# Patient Record
Sex: MALE | Race: BLACK OR AFRICAN AMERICAN | Employment: FULL TIME | URBAN - METROPOLITAN AREA
[De-identification: names, ages, dates, MRNs, and addresses within clinical notes are randomized per-mention and may not be internally consistent; named-entity substitution may affect disease eponyms.]

---

## 2018-03-20 ENCOUNTER — TELEPHONE (OUTPATIENT)
Dept: FAMILY MEDICINE CLINIC | Age: 48
End: 2018-03-20

## 2018-04-24 ENCOUNTER — HOSPITAL ENCOUNTER (EMERGENCY)
Age: 48
Discharge: HOME | End: 2018-04-24
Attending: EMERGENCY MEDICINE
Payer: COMMERCIAL

## 2018-04-24 ENCOUNTER — APPOINTMENT (OUTPATIENT)
Dept: GENERAL RADIOLOGY | Age: 48
End: 2018-04-24
Payer: COMMERCIAL

## 2018-04-24 VITALS
OXYGEN SATURATION: 99 % | BODY MASS INDEX: 42.66 KG/M2 | WEIGHT: 315 LBS | TEMPERATURE: 98.2 F | RESPIRATION RATE: 18 BRPM | DIASTOLIC BLOOD PRESSURE: 117 MMHG | SYSTOLIC BLOOD PRESSURE: 187 MMHG | HEIGHT: 72 IN | HEART RATE: 73 BPM

## 2018-04-24 DIAGNOSIS — M75.50 SUBACROMIAL BURSITIS: Primary | ICD-10-CM

## 2018-04-24 DIAGNOSIS — I10 ESSENTIAL HYPERTENSION: ICD-10-CM

## 2018-04-24 PROCEDURE — 99283 EMERGENCY DEPT VISIT LOW MDM: CPT

## 2018-04-24 PROCEDURE — 73030 X-RAY EXAM OF SHOULDER: CPT

## 2018-04-24 PROCEDURE — 6370000000 HC RX 637 (ALT 250 FOR IP): Performed by: EMERGENCY MEDICINE

## 2018-04-24 RX ORDER — CLONIDINE HYDROCHLORIDE 0.1 MG/1
0.1 TABLET ORAL ONCE
Status: COMPLETED | OUTPATIENT
Start: 2018-04-24 | End: 2018-04-24

## 2018-04-24 RX ORDER — DOXAZOSIN MESYLATE 1 MG/1
1 TABLET ORAL NIGHTLY
Qty: 30 TABLET | Refills: 3 | Status: ON HOLD | OUTPATIENT
Start: 2018-04-24 | End: 2020-10-05 | Stop reason: HOSPADM

## 2018-04-24 RX ORDER — HYDRALAZINE HYDROCHLORIDE 50 MG/1
50 TABLET, FILM COATED ORAL EVERY 8 HOURS SCHEDULED
Status: DISCONTINUED | OUTPATIENT
Start: 2018-04-24 | End: 2018-04-24 | Stop reason: HOSPADM

## 2018-04-24 RX ORDER — DICLOFENAC SODIUM 75 MG/1
75 TABLET, DELAYED RELEASE ORAL 2 TIMES DAILY
Qty: 20 TABLET | Refills: 0 | Status: SHIPPED | OUTPATIENT
Start: 2018-04-24

## 2018-04-24 RX ADMIN — CLONIDINE HYDROCHLORIDE 0.1 MG: 0.1 TABLET ORAL at 12:19

## 2018-04-24 RX ADMIN — HYDRALAZINE HYDROCHLORIDE 50 MG: 50 TABLET, FILM COATED ORAL at 12:52

## 2018-04-24 ASSESSMENT — PAIN DESCRIPTION - DESCRIPTORS: DESCRIPTORS: STABBING

## 2018-04-24 ASSESSMENT — PAIN DESCRIPTION - PAIN TYPE: TYPE: ACUTE PAIN

## 2018-04-24 ASSESSMENT — PAIN DESCRIPTION - FREQUENCY: FREQUENCY: CONTINUOUS

## 2018-04-24 ASSESSMENT — PAIN DESCRIPTION - LOCATION: LOCATION: SHOULDER

## 2018-04-24 ASSESSMENT — PAIN DESCRIPTION - ORIENTATION: ORIENTATION: LEFT

## 2020-10-01 ENCOUNTER — APPOINTMENT (OUTPATIENT)
Dept: ULTRASOUND IMAGING | Age: 50
DRG: 439 | End: 2020-10-01
Payer: COMMERCIAL

## 2020-10-01 ENCOUNTER — HOSPITAL ENCOUNTER (INPATIENT)
Age: 50
LOS: 3 days | Discharge: HOME | DRG: 439 | End: 2020-10-05
Attending: EMERGENCY MEDICINE | Admitting: FAMILY MEDICINE
Payer: COMMERCIAL

## 2020-10-01 PROCEDURE — 83605 ASSAY OF LACTIC ACID: CPT

## 2020-10-01 PROCEDURE — 85025 COMPLETE CBC W/AUTO DIFF WBC: CPT

## 2020-10-01 PROCEDURE — 99284 EMERGENCY DEPT VISIT MOD MDM: CPT

## 2020-10-01 PROCEDURE — 96374 THER/PROPH/DIAG INJ IV PUSH: CPT

## 2020-10-01 PROCEDURE — 96375 TX/PRO/DX INJ NEW DRUG ADDON: CPT

## 2020-10-01 PROCEDURE — 99283 EMERGENCY DEPT VISIT LOW MDM: CPT

## 2020-10-01 PROCEDURE — 83690 ASSAY OF LIPASE: CPT

## 2020-10-01 PROCEDURE — 76705 ECHO EXAM OF ABDOMEN: CPT

## 2020-10-01 RX ORDER — ONDANSETRON 2 MG/ML
4 INJECTION INTRAMUSCULAR; INTRAVENOUS EVERY 6 HOURS PRN
Status: DISCONTINUED | OUTPATIENT
Start: 2020-10-01 | End: 2020-10-02

## 2020-10-01 RX ORDER — 0.9 % SODIUM CHLORIDE 0.9 %
1000 INTRAVENOUS SOLUTION INTRAVENOUS ONCE
Status: COMPLETED | OUTPATIENT
Start: 2020-10-01 | End: 2020-10-02

## 2020-10-01 RX ORDER — PANTOPRAZOLE SODIUM 40 MG/10ML
40 INJECTION, POWDER, LYOPHILIZED, FOR SOLUTION INTRAVENOUS ONCE
Status: COMPLETED | OUTPATIENT
Start: 2020-10-01 | End: 2020-10-02

## 2020-10-02 ENCOUNTER — APPOINTMENT (OUTPATIENT)
Dept: CT IMAGING | Age: 50
DRG: 439 | End: 2020-10-02
Payer: COMMERCIAL

## 2020-10-02 PROBLEM — E66.01 MORBID OBESITY WITH BMI OF 50.0-59.9, ADULT (HCC): Status: ACTIVE | Noted: 2017-10-02

## 2020-10-02 PROBLEM — I10 POORLY-CONTROLLED HYPERTENSION: Status: ACTIVE | Noted: 2020-10-02

## 2020-10-02 PROBLEM — K76.0 NON-ALCOHOLIC FATTY LIVER DISEASE: Status: ACTIVE | Noted: 2020-10-02

## 2020-10-02 PROBLEM — K85.90 METABOLIC PANCREATITIS: Status: ACTIVE | Noted: 2020-10-02

## 2020-10-02 PROBLEM — K85.90 ACUTE PANCREATITIS: Status: ACTIVE | Noted: 2020-10-02

## 2020-10-02 LAB
ALBUMIN SERPL-MCNC: 3.9 G/DL (ref 3.5–5.2)
ALP BLD-CCNC: 88 U/L (ref 40–129)
ALT SERPL-CCNC: 14 U/L (ref 0–40)
ANION GAP SERPL CALCULATED.3IONS-SCNC: 14 MMOL/L (ref 7–16)
AST SERPL-CCNC: 27 U/L (ref 0–39)
BACTERIA: NORMAL /HPF
BASOPHILS ABSOLUTE: 0.04 E9/L (ref 0–0.2)
BASOPHILS RELATIVE PERCENT: 0.4 % (ref 0–2)
BILIRUB SERPL-MCNC: 0.6 MG/DL (ref 0–1.2)
BILIRUBIN URINE: ABNORMAL
BLOOD, URINE: ABNORMAL
BUN BLDV-MCNC: 10 MG/DL (ref 6–20)
CALCIUM SERPL-MCNC: 9 MG/DL (ref 8.6–10.2)
CHLORIDE BLD-SCNC: 99 MMOL/L (ref 98–107)
CHOLESTEROL, TOTAL: 166 MG/DL (ref 0–199)
CLARITY: CLEAR
CO2: 22 MMOL/L (ref 22–29)
COLOR: YELLOW
CREAT SERPL-MCNC: 1 MG/DL (ref 0.7–1.2)
EKG ATRIAL RATE: 74 BPM
EKG P AXIS: 35 DEGREES
EKG P-R INTERVAL: 170 MS
EKG Q-T INTERVAL: 402 MS
EKG QRS DURATION: 104 MS
EKG QTC CALCULATION (BAZETT): 446 MS
EKG R AXIS: 8 DEGREES
EKG T AXIS: 35 DEGREES
EKG VENTRICULAR RATE: 74 BPM
EOSINOPHILS ABSOLUTE: 0.09 E9/L (ref 0.05–0.5)
EOSINOPHILS RELATIVE PERCENT: 0.9 % (ref 0–6)
GFR AFRICAN AMERICAN: >60
GFR NON-AFRICAN AMERICAN: >60 ML/MIN/1.73
GLUCOSE BLD-MCNC: 88 MG/DL (ref 74–99)
GLUCOSE URINE: NEGATIVE MG/DL
HCT VFR BLD CALC: 40.9 % (ref 37–54)
HDLC SERPL-MCNC: 32 MG/DL
HEMOGLOBIN: 13.8 G/DL (ref 12.5–16.5)
IMMATURE GRANULOCYTES #: 0.02 E9/L
IMMATURE GRANULOCYTES %: 0.2 % (ref 0–5)
KETONES, URINE: 15 MG/DL
LACTIC ACID: 0.8 MMOL/L (ref 0.5–2.2)
LDL CHOLESTEROL CALCULATED: 115 MG/DL (ref 0–99)
LEUKOCYTE ESTERASE, URINE: NEGATIVE
LIPASE: 292 U/L (ref 13–60)
LYMPHOCYTES ABSOLUTE: 3.77 E9/L (ref 1.5–4)
LYMPHOCYTES RELATIVE PERCENT: 37.1 % (ref 20–42)
MCH RBC QN AUTO: 29.9 PG (ref 26–35)
MCHC RBC AUTO-ENTMCNC: 33.7 % (ref 32–34.5)
MCV RBC AUTO: 88.7 FL (ref 80–99.9)
MONOCYTES ABSOLUTE: 0.82 E9/L (ref 0.1–0.95)
MONOCYTES RELATIVE PERCENT: 8.1 % (ref 2–12)
NEUTROPHILS ABSOLUTE: 5.41 E9/L (ref 1.8–7.3)
NEUTROPHILS RELATIVE PERCENT: 53.3 % (ref 43–80)
NITRITE, URINE: NEGATIVE
PDW BLD-RTO: 14.1 FL (ref 11.5–15)
PH UA: 5.5 (ref 5–9)
PLATELET # BLD: 307 E9/L (ref 130–450)
PMV BLD AUTO: 11.2 FL (ref 7–12)
POTASSIUM SERPL-SCNC: 4.2 MMOL/L (ref 3.5–5)
PROTEIN UA: NEGATIVE MG/DL
RBC # BLD: 4.61 E12/L (ref 3.8–5.8)
RBC UA: NORMAL /HPF (ref 0–2)
REASON FOR REJECTION: NORMAL
REJECTED TEST: NORMAL
SODIUM BLD-SCNC: 135 MMOL/L (ref 132–146)
SPECIFIC GRAVITY UA: 1.02 (ref 1–1.03)
TOTAL PROTEIN: 7.2 G/DL (ref 6.4–8.3)
TRIGL SERPL-MCNC: 96 MG/DL (ref 0–149)
TROPONIN: <0.01 NG/ML (ref 0–0.03)
UROBILINOGEN, URINE: 0.2 E.U./DL
VLDLC SERPL CALC-MCNC: 19 MG/DL
WBC # BLD: 10.2 E9/L (ref 4.5–11.5)
WBC UA: NORMAL /HPF (ref 0–5)

## 2020-10-02 PROCEDURE — 6360000002 HC RX W HCPCS: Performed by: INTERNAL MEDICINE

## 2020-10-02 PROCEDURE — 36415 COLL VENOUS BLD VENIPUNCTURE: CPT

## 2020-10-02 PROCEDURE — 81001 URINALYSIS AUTO W/SCOPE: CPT

## 2020-10-02 PROCEDURE — 2580000003 HC RX 258: Performed by: INTERNAL MEDICINE

## 2020-10-02 PROCEDURE — 6360000004 HC RX CONTRAST MEDICATION: Performed by: STUDENT IN AN ORGANIZED HEALTH CARE EDUCATION/TRAINING PROGRAM

## 2020-10-02 PROCEDURE — 2580000003 HC RX 258: Performed by: STUDENT IN AN ORGANIZED HEALTH CARE EDUCATION/TRAINING PROGRAM

## 2020-10-02 PROCEDURE — 6360000002 HC RX W HCPCS: Performed by: EMERGENCY MEDICINE

## 2020-10-02 PROCEDURE — 93010 ELECTROCARDIOGRAM REPORT: CPT | Performed by: INTERNAL MEDICINE

## 2020-10-02 PROCEDURE — 84484 ASSAY OF TROPONIN QUANT: CPT

## 2020-10-02 PROCEDURE — 80061 LIPID PANEL: CPT

## 2020-10-02 PROCEDURE — C9113 INJ PANTOPRAZOLE SODIUM, VIA: HCPCS | Performed by: EMERGENCY MEDICINE

## 2020-10-02 PROCEDURE — 6370000000 HC RX 637 (ALT 250 FOR IP): Performed by: INTERNAL MEDICINE

## 2020-10-02 PROCEDURE — G0378 HOSPITAL OBSERVATION PER HR: HCPCS

## 2020-10-02 PROCEDURE — 93005 ELECTROCARDIOGRAM TRACING: CPT | Performed by: EMERGENCY MEDICINE

## 2020-10-02 PROCEDURE — 2580000003 HC RX 258: Performed by: EMERGENCY MEDICINE

## 2020-10-02 PROCEDURE — 74177 CT ABD & PELVIS W/CONTRAST: CPT

## 2020-10-02 PROCEDURE — 99223 1ST HOSP IP/OBS HIGH 75: CPT | Performed by: INTERNAL MEDICINE

## 2020-10-02 PROCEDURE — 80053 COMPREHEN METABOLIC PANEL: CPT

## 2020-10-02 RX ORDER — SODIUM CHLORIDE 0.9 % (FLUSH) 0.9 %
10 SYRINGE (ML) INJECTION PRN
Status: DISCONTINUED | OUTPATIENT
Start: 2020-10-02 | End: 2020-10-02 | Stop reason: SDUPTHER

## 2020-10-02 RX ORDER — SODIUM CHLORIDE, SODIUM LACTATE, POTASSIUM CHLORIDE, CALCIUM CHLORIDE 600; 310; 30; 20 MG/100ML; MG/100ML; MG/100ML; MG/100ML
INJECTION, SOLUTION INTRAVENOUS CONTINUOUS
Status: DISCONTINUED | OUTPATIENT
Start: 2020-10-02 | End: 2020-10-04

## 2020-10-02 RX ORDER — LABETALOL HYDROCHLORIDE 5 MG/ML
20 INJECTION, SOLUTION INTRAVENOUS EVERY 4 HOURS PRN
Status: DISCONTINUED | OUTPATIENT
Start: 2020-10-02 | End: 2020-10-05 | Stop reason: HOSPADM

## 2020-10-02 RX ORDER — MORPHINE SULFATE 2 MG/ML
2 INJECTION, SOLUTION INTRAMUSCULAR; INTRAVENOUS ONCE
Status: COMPLETED | OUTPATIENT
Start: 2020-10-02 | End: 2020-10-02

## 2020-10-02 RX ORDER — POLYETHYLENE GLYCOL 3350 17 G/17G
17 POWDER, FOR SOLUTION ORAL DAILY PRN
Status: DISCONTINUED | OUTPATIENT
Start: 2020-10-02 | End: 2020-10-02 | Stop reason: SDUPTHER

## 2020-10-02 RX ORDER — SODIUM CHLORIDE 9 MG/ML
10 INJECTION INTRAVENOUS DAILY
Status: DISCONTINUED | OUTPATIENT
Start: 2020-10-02 | End: 2020-10-04

## 2020-10-02 RX ORDER — ACETAMINOPHEN 650 MG/1
650 SUPPOSITORY RECTAL EVERY 6 HOURS PRN
Status: DISCONTINUED | OUTPATIENT
Start: 2020-10-02 | End: 2020-10-05 | Stop reason: HOSPADM

## 2020-10-02 RX ORDER — HYDRALAZINE HYDROCHLORIDE 25 MG/1
25 TABLET, FILM COATED ORAL ONCE
Status: COMPLETED | OUTPATIENT
Start: 2020-10-02 | End: 2020-10-02

## 2020-10-02 RX ORDER — ACETAMINOPHEN 650 MG/1
650 SUPPOSITORY RECTAL EVERY 6 HOURS PRN
Status: DISCONTINUED | OUTPATIENT
Start: 2020-10-02 | End: 2020-10-02 | Stop reason: SDUPTHER

## 2020-10-02 RX ORDER — SODIUM CHLORIDE 0.9 % (FLUSH) 0.9 %
10 SYRINGE (ML) INJECTION EVERY 12 HOURS SCHEDULED
Status: DISCONTINUED | OUTPATIENT
Start: 2020-10-02 | End: 2020-10-02 | Stop reason: SDUPTHER

## 2020-10-02 RX ORDER — ONDANSETRON 2 MG/ML
4 INJECTION INTRAMUSCULAR; INTRAVENOUS EVERY 6 HOURS PRN
Status: DISCONTINUED | OUTPATIENT
Start: 2020-10-02 | End: 2020-10-02 | Stop reason: SDUPTHER

## 2020-10-02 RX ORDER — PROMETHAZINE HYDROCHLORIDE 25 MG/1
12.5 TABLET ORAL EVERY 6 HOURS PRN
Status: DISCONTINUED | OUTPATIENT
Start: 2020-10-02 | End: 2020-10-05 | Stop reason: HOSPADM

## 2020-10-02 RX ORDER — FAMOTIDINE 20 MG/1
20 TABLET, FILM COATED ORAL 2 TIMES DAILY
Status: DISCONTINUED | OUTPATIENT
Start: 2020-10-02 | End: 2020-10-02

## 2020-10-02 RX ORDER — SODIUM CHLORIDE 0.9 % (FLUSH) 0.9 %
10 SYRINGE (ML) INJECTION EVERY 12 HOURS SCHEDULED
Status: DISCONTINUED | OUTPATIENT
Start: 2020-10-02 | End: 2020-10-05 | Stop reason: HOSPADM

## 2020-10-02 RX ORDER — METOPROLOL SUCCINATE 25 MG/1
25 TABLET, EXTENDED RELEASE ORAL 2 TIMES DAILY
Status: DISCONTINUED | OUTPATIENT
Start: 2020-10-02 | End: 2020-10-02

## 2020-10-02 RX ORDER — ACETAMINOPHEN 325 MG/1
650 TABLET ORAL EVERY 6 HOURS PRN
Status: DISCONTINUED | OUTPATIENT
Start: 2020-10-02 | End: 2020-10-05 | Stop reason: HOSPADM

## 2020-10-02 RX ORDER — DOXAZOSIN MESYLATE 1 MG/1
1 TABLET ORAL NIGHTLY
Status: DISCONTINUED | OUTPATIENT
Start: 2020-10-02 | End: 2020-10-04

## 2020-10-02 RX ORDER — HYDRALAZINE HYDROCHLORIDE 20 MG/ML
10 INJECTION INTRAMUSCULAR; INTRAVENOUS EVERY 6 HOURS PRN
Status: DISCONTINUED | OUTPATIENT
Start: 2020-10-02 | End: 2020-10-05 | Stop reason: HOSPADM

## 2020-10-02 RX ORDER — LISINOPRIL 10 MG/1
10 TABLET ORAL 2 TIMES DAILY
Status: DISCONTINUED | OUTPATIENT
Start: 2020-10-02 | End: 2020-10-02

## 2020-10-02 RX ORDER — PANTOPRAZOLE SODIUM 40 MG/10ML
40 INJECTION, POWDER, LYOPHILIZED, FOR SOLUTION INTRAVENOUS DAILY
Status: DISCONTINUED | OUTPATIENT
Start: 2020-10-02 | End: 2020-10-04

## 2020-10-02 RX ORDER — ACETAMINOPHEN 325 MG/1
650 TABLET ORAL EVERY 6 HOURS PRN
Status: DISCONTINUED | OUTPATIENT
Start: 2020-10-02 | End: 2020-10-02 | Stop reason: SDUPTHER

## 2020-10-02 RX ORDER — POLYETHYLENE GLYCOL 3350 17 G/17G
17 POWDER, FOR SOLUTION ORAL DAILY PRN
Status: DISCONTINUED | OUTPATIENT
Start: 2020-10-02 | End: 2020-10-05 | Stop reason: HOSPADM

## 2020-10-02 RX ORDER — ONDANSETRON 2 MG/ML
4 INJECTION INTRAMUSCULAR; INTRAVENOUS EVERY 6 HOURS PRN
Status: DISCONTINUED | OUTPATIENT
Start: 2020-10-02 | End: 2020-10-05 | Stop reason: HOSPADM

## 2020-10-02 RX ORDER — SODIUM CHLORIDE 0.9 % (FLUSH) 0.9 %
10 SYRINGE (ML) INJECTION PRN
Status: DISCONTINUED | OUTPATIENT
Start: 2020-10-02 | End: 2020-10-05 | Stop reason: HOSPADM

## 2020-10-02 RX ORDER — AMLODIPINE BESYLATE 2.5 MG/1
2.5 TABLET ORAL DAILY PRN
Status: DISCONTINUED | OUTPATIENT
Start: 2020-10-02 | End: 2020-10-05 | Stop reason: HOSPADM

## 2020-10-02 RX ADMIN — METOPROLOL SUCCINATE 25 MG: 25 TABLET, EXTENDED RELEASE ORAL at 20:36

## 2020-10-02 RX ADMIN — SODIUM CHLORIDE 1000 ML: 9 INJECTION, SOLUTION INTRAVENOUS at 00:12

## 2020-10-02 RX ADMIN — ENOXAPARIN SODIUM 40 MG: 40 INJECTION SUBCUTANEOUS at 10:21

## 2020-10-02 RX ADMIN — SODIUM CHLORIDE, POTASSIUM CHLORIDE, SODIUM LACTATE AND CALCIUM CHLORIDE: 600; 310; 30; 20 INJECTION, SOLUTION INTRAVENOUS at 10:18

## 2020-10-02 RX ADMIN — LISINOPRIL 10 MG: 10 TABLET ORAL at 20:35

## 2020-10-02 RX ADMIN — SODIUM CHLORIDE, PRESERVATIVE FREE 10 ML: 5 INJECTION INTRAVENOUS at 20:36

## 2020-10-02 RX ADMIN — FAMOTIDINE 20 MG: 20 TABLET, FILM COATED ORAL at 10:18

## 2020-10-02 RX ADMIN — HYDRALAZINE HYDROCHLORIDE 25 MG: 25 TABLET, FILM COATED ORAL at 18:06

## 2020-10-02 RX ADMIN — Medication 10 ML: at 02:58

## 2020-10-02 RX ADMIN — IOPAMIDOL 90 ML: 755 INJECTION, SOLUTION INTRAVENOUS at 02:58

## 2020-10-02 RX ADMIN — HYDRALAZINE HYDROCHLORIDE 10 MG: 20 INJECTION INTRAMUSCULAR; INTRAVENOUS at 21:05

## 2020-10-02 RX ADMIN — MORPHINE SULFATE 2 MG: 2 INJECTION, SOLUTION INTRAMUSCULAR; INTRAVENOUS at 04:06

## 2020-10-02 RX ADMIN — Medication 10 ML: at 10:22

## 2020-10-02 RX ADMIN — ONDANSETRON 4 MG: 2 INJECTION INTRAMUSCULAR; INTRAVENOUS at 00:13

## 2020-10-02 RX ADMIN — ACETAMINOPHEN 650 MG: 325 TABLET, FILM COATED ORAL at 18:06

## 2020-10-02 RX ADMIN — SODIUM CHLORIDE, POTASSIUM CHLORIDE, SODIUM LACTATE AND CALCIUM CHLORIDE: 600; 310; 30; 20 INJECTION, SOLUTION INTRAVENOUS at 18:06

## 2020-10-02 RX ADMIN — DOXAZOSIN 1 MG: 2 TABLET ORAL at 20:35

## 2020-10-02 RX ADMIN — PANTOPRAZOLE SODIUM 40 MG: 40 INJECTION, POWDER, FOR SOLUTION INTRAVENOUS at 00:13

## 2020-10-02 ASSESSMENT — PAIN DESCRIPTION - LOCATION
LOCATION: ABDOMEN
LOCATION: ABDOMEN

## 2020-10-02 ASSESSMENT — PAIN SCALES - GENERAL
PAINLEVEL_OUTOF10: 0
PAINLEVEL_OUTOF10: 0
PAINLEVEL_OUTOF10: 3
PAINLEVEL_OUTOF10: 0
PAINLEVEL_OUTOF10: 6
PAINLEVEL_OUTOF10: 4
PAINLEVEL_OUTOF10: 0

## 2020-10-02 ASSESSMENT — PAIN DESCRIPTION - DESCRIPTORS
DESCRIPTORS: CRAMPING;ACHING
DESCRIPTORS: ACHING;DISCOMFORT;SORE

## 2020-10-02 ASSESSMENT — PAIN - FUNCTIONAL ASSESSMENT
PAIN_FUNCTIONAL_ASSESSMENT: ACTIVITIES ARE NOT PREVENTED
PAIN_FUNCTIONAL_ASSESSMENT: ACTIVITIES ARE NOT PREVENTED

## 2020-10-02 ASSESSMENT — PAIN DESCRIPTION - PAIN TYPE
TYPE: ACUTE PAIN
TYPE: ACUTE PAIN

## 2020-10-02 ASSESSMENT — PAIN DESCRIPTION - FREQUENCY
FREQUENCY: INTERMITTENT
FREQUENCY: INTERMITTENT

## 2020-10-02 ASSESSMENT — PAIN DESCRIPTION - ONSET
ONSET: ON-GOING
ONSET: GRADUAL

## 2020-10-02 ASSESSMENT — PAIN DESCRIPTION - PROGRESSION
CLINICAL_PROGRESSION: GRADUALLY WORSENING
CLINICAL_PROGRESSION: GRADUALLY WORSENING

## 2020-10-02 NOTE — PLAN OF CARE
Problem:  Bowel/Gastric:  Goal: Bowel function will improve  Description: Bowel function will improve  Outcome: Met This Shift

## 2020-10-02 NOTE — H&P
[facility] Service    History & Physical    Patient:  Carno Perez  YOB: 1970  Date of Service: 10/2/2020  MRN: [id number]   Acct:  [de-identified]   Primary Care Physician: No primary care provider on file. Chief Complaint: Abdominal pain    History of Present Illness:   History obtained from chart review and the patient. Mr. Checo Broderick is a 48 y.o. male presented to the [facility] ED for mid upper abdominal pain [no lower abdominal pain], beginning 2 weeks ago. Pain has been persistent, moderate in severity, and worsened by nothing and asso with decreased appetite for the past week and nausea but no vomiting, diarrhea, black or tarry stools. He reported no fever chills. Patient was told in the past that he had gallbladder issues. Patient reported no leg pain or swelling; denied cough or chest pain. Past Medical History:  History reviewed. No pertinent past medical history. Past Surgical History:        Procedure Laterality Date    EYE SURGERY      FINGER AMPUTATION         Home Medications:   No current facility-administered medications on file prior to encounter. Current Outpatient Medications on File Prior to Encounter   Medication Sig Dispense Refill    doxazosin (CARDURA) 1 MG tablet Take 1 tablet by mouth nightly 30 tablet 3    diclofenac (VOLTAREN) 75 MG EC tablet Take 1 tablet by mouth 2 times daily 20 tablet 0    famotidine (PEPCID) 20 MG tablet Take 1 tablet by mouth 2 times daily for 7 days 14 tablet 0       Allergies:  Patient has no known allergies. Social History:    reports that he has been smoking cigarettes. He has smoked for the past 20.00 years. He has never used smokeless tobacco. He reports that he does not drink alcohol or use drugs. Family History:   History reviewed. No pertinent family history.     Review of systems:  Constitutional: no fever, no night sweats, no fatigue  Head: no headache, no head injury, no migranes. Eye: no blurring of vision, no double vision. Ears: no hearing difficulty, no tinnitus  Mouth/throat: no ulceration, dental caries, dysphagia  Lungs: no cough, no shortness of breath, no wheeze  CVS: no palpitation, no chest pain, no shortness of breath  GI: See H&P  JESSIE: no dysuria, frequency and urgency, no hematuria, no kidney stones  Musculoskeletal: no joint pain, swelling , stiffness  Endocrine: no polyuria, polydypsia, no cold or heat intolerence  Hematology: no anemia, no easy brusing or bleeding, no hx of clotting disorder  Dermatology: no skin rash, no eczema, no prurities,  Psychiatry: no depression, no anxiety,no panic attacks, no suicide ideation  Neurology: no syncope, no seizures, no numbness or tingling of hands, no numbness or tingling of feet, no paresis    10 point review of systems completed, all other than noted above are negative. Vitals:   Vitals:    10/02/20 0515   BP: (!) 168/88   Pulse: 74   Resp: 20   Temp: 97.8 °F (36.6 °C)   SpO2: 97%      BMI: Body mass index is 54.25 kg/m². Exam:  Constitutional/General: Alert and oriented x3, well appearing, non toxic in NAD; comfortable morbidly obese pickwickian BMI 54.3. Head: Normocephalic and atraumatic  Eyes: PERRL, EOMI  Mouth: Oropharynx clear, handling secretions, no trismus  Neck: Supple, full ROM, non tender to palpation in the midline, no stridor, no crepitus, no meningeal signs  Pulmonary: Lungs clear to auscultation bilaterally, no wheezes, rales, or rhonchi. Not in respiratory distress  Cardiovascular:  Regular rate. Regular rhythm. No murmurs, gallops, or rubs. 2+ distal pulses  Chest: no chest wall tenderness  Abdomen: Soft. Difficult to discern tenderness. Non distended. +BS. No rebound, guarding, or rigidity. No pulsatile masses appreciated. Massively protuberant abdomen with large pannus over lying pubis and groin. Musculoskeletal: Moves all extremities x 4. Warm and well perfused, no clubbing, cyanosis, or edema. Capillary refill <3 seconds  Skin: warm and dry. No rashes.    Neurologic: GCS 15, CN 2-12 grossly intact, no focal deficits, symmetric strength 5/5 in the upper and lower extremities bilaterally  Psych: Normal Affect      Review of Labs and Diagnostic Testing:    Recent Results (from the past 24 hour(s))   CBC auto differential    Collection Time: 10/01/20 11:17 PM   Result Value Ref Range    WBC 10.2 4.5 - 11.5 E9/L    RBC 4.61 3.80 - 5.80 E12/L    Hemoglobin 13.8 12.5 - 16.5 g/dL    Hematocrit 40.9 37.0 - 54.0 %    MCV 88.7 80.0 - 99.9 fL    MCH 29.9 26.0 - 35.0 pg    MCHC 33.7 32.0 - 34.5 %    RDW 14.1 11.5 - 15.0 fL    Platelets 049 652 - 095 E9/L    MPV 11.2 7.0 - 12.0 fL    Neutrophils % 53.3 43.0 - 80.0 %    Immature Granulocytes % 0.2 0.0 - 5.0 %    Lymphocytes % 37.1 20.0 - 42.0 %    Monocytes % 8.1 2.0 - 12.0 %    Eosinophils % 0.9 0.0 - 6.0 %    Basophils % 0.4 0.0 - 2.0 %    Neutrophils Absolute 5.41 1.80 - 7.30 E9/L    Immature Granulocytes # 0.02 E9/L    Lymphocytes Absolute 3.77 1.50 - 4.00 E9/L    Monocytes Absolute 0.82 0.10 - 0.95 E9/L    Eosinophils Absolute 0.09 0.05 - 0.50 E9/L    Basophils Absolute 0.04 0.00 - 0.20 E9/L   Lipase    Collection Time: 10/01/20 11:17 PM   Result Value Ref Range    Lipase 292 (H) 13 - 60 U/L   Lactic Acid, Plasma    Collection Time: 10/01/20 11:17 PM   Result Value Ref Range    Lactic Acid 0.8 0.5 - 2.2 mmol/L   EKG 12 Lead    Collection Time: 10/02/20 12:26 AM   Result Value Ref Range    Ventricular Rate 74 BPM    Atrial Rate 74 BPM    P-R Interval 170 ms    QRS Duration 104 ms    Q-T Interval 402 ms    QTc Calculation (Bazett) 446 ms    P Axis 35 degrees    R Axis 8 degrees    T Axis 35 degrees   SPECIMEN REJECTION    Collection Time: 10/02/20 12:52 AM   Result Value Ref Range    Rejected Test cmp, trop     Reason for Rejection see below    Troponin    Collection Time: 10/02/20  1:00 AM   Result Value Ref Range    Troponin <0.01 0.00 - 0.03 ng/mL   Comprehensive Metabolic Panel    Collection Time: 10/02/20  1:00 AM   Result Value Ref Range    Sodium 135 132 - 146 mmol/L    Potassium 4.2 3.5 - 5.0 mmol/L    Chloride 99 98 - 107 mmol/L    CO2 22 22 - 29 mmol/L    Anion Gap 14 7 - 16 mmol/L    Glucose 88 74 - 99 mg/dL    BUN 10 6 - 20 mg/dL    CREATININE 1.0 0.7 - 1.2 mg/dL    GFR Non-African American >60 >=60 mL/min/1.73    GFR African American >60     Calcium 9.0 8.6 - 10.2 mg/dL    Total Protein 7.2 6.4 - 8.3 g/dL    Alb 3.9 3.5 - 5.2 g/dL    Total Bilirubin 0.6 0.0 - 1.2 mg/dL    Alkaline Phosphatase 88 40 - 129 U/L    ALT 14 0 - 40 U/L    AST 27 0 - 39 U/L   Urinalysis    Collection Time: 10/02/20  4:00 AM   Result Value Ref Range    Color, UA Yellow Straw/Yellow    Clarity, UA Clear Clear    Glucose, Ur Negative Negative mg/dL    Bilirubin Urine SMALL (A) Negative    Ketones, Urine 15 (A) Negative mg/dL    Specific Gravity, UA 1.025 1.005 - 1.030    Blood, Urine SMALL (A) Negative    pH, UA 5.5 5.0 - 9.0    Protein, UA Negative Negative mg/dL    Urobilinogen, Urine 0.2 <2.0 E.U./dL    Nitrite, Urine Negative Negative    Leukocyte Esterase, Urine Negative Negative   Microscopic Urinalysis    Collection Time: 10/02/20  4:00 AM   Result Value Ref Range    WBC, UA NONE 0 - 5 /HPF    RBC, UA 1-3 0 - 2 /HPF    Bacteria, UA NONE SEEN None Seen /HPF       Radiology:     Ct Abdomen Pelvis W Iv Contrast Additional Contrast? None Date: 10/2/2020  EXAMINATION: CT OF THE ABDOMEN AND PELVIS WITH CONTRAST 10/2/2020 2:58 am TECHNIQUE: CT of the abdomen and pelvis was performed with the administration of intravenous contrast. Multiplanar reformatted images are provided for review. Dose modulation, iterative reconstruction, and/or weight based adjustment of the mA/kV was utilized to reduce the radiation dose to as low as reasonably achievable.  COMPARISON: 05/10/2016 HISTORY: ORDERING SYSTEM PROVIDED HISTORY: abdominal pain TECHNOLOGIST PROVIDED HISTORY: Reason for exam:->abdominal pain Additional Contrast?->None What reading provider will be dictating this exam?->CRC FINDINGS: Lower Chest:  Visualized portion of the lower chest demonstrates no acute abnormality. Organs: There is diffuse hepatic steatosis. No evidence of focal hepatic lesion. No biliary ductal dilatation. Portal vein is patent. Gallbladder demonstrates no acute abnormality. No evidence of splenomegaly or focal splenic lesion. No focal adrenal nodule. The kidneys enhance symmetrically without evidence of hydronephrosis. There is extensive inflammatory changes involving the head and body of the pancreas suggesting acute pancreatitis. There is peripancreatic inflammatory changes with multiple peripancreatic small lymph nodes. There is homogeneous enhancement of the pancreas. No pancreatic pseudocyst. GI/Bowel: Stomach and duodenal sweep demonstrate no acute abnormality. There is no evidence of bowel obstruction. No evidence of abnormal bowel wall thickening or distension. The appendix is visualized and is unremarkable. No evidence of acute appendicitis. Pelvis: Bladder and prostate demonstrate no acute abnormality. Peritoneum/Retroperitoneum: No ascites or free air. Aorta demonstrates normal caliber. No focal drainable fluid collection. Bones/Soft Tissues:  No acute abnormality of the visualized osseous structures. Mild lower lumbar spine disc degenerative changes. Findings consistent with acute uncomplicated pancreatitis. Severe hepatic steatosis.      Us Gallbladder Ruq    Result Date: 10/1/2020  EXAMINATION: RIGHT UPPER QUADRANT ULTRASOUND 10/1/2020 9:38 pm COMPARISON: Correlation is made with a prior CT of the abdomen and pelvis from May 2016 HISTORY: [address] [place] [facility]: abdominal pain TECHNOLOGIST PROVIDED HISTORY: Reason for exam:->abdominal pain What reading provider will be dictating this exam?->CRC FINDINGS: Evaluation is limited by the patient's large body habitus. No evidence of gallstones. No gallbladder wall thickening or pericholecystic fluid. The common bile duct appears dilated measuring approximately 9 mm. Pancreas not imaged on this examination. No evidence of cholelithiasis or signs of cholecystitis. The common bile duct is dilated measuring approximately 9 mm. Distal obstructive process cannot be excluded. RECOMMENDATIONS: Consider further evaluation with CT of the abdomen and pelvis. EKG: With sinus rhythm rate 74 normal intervals and axis normal ECG    Assessment:    Active Problems:    Acute pancreatitis    Morbid obesity with BMI of 50.0-59.9, adult (HCC)    Non-alcoholic fatty liver disease    Metabolic pancreatitis    Poorly-controlled hypertension  Resolved Problems:    * No resolved hospital problems. *      Plan:  1. Bowel rest, n.p.o. except for sips of water and medications; Ringer's lactate at 125 cc an hour; nonopiate pain control; follow lipase   2. Absolute need weight reduction with metabolic acute pancreatitis and severe hepatic steatosis  3. /100.   Started lisinopril 10 mg bid, metoprolol 25 mg bid, hydralazine 25 mg p.o.x1; hydralazine 10 mg IV as needed for sbp > 180, dbp greater than 100.  4. Admit us observation      DVT prophylaxis: [x] Lovenox                                 [] SCDs                                 [] SQ Heparin                                 [] Encourage ambulation, low risk for DVT, no chemical or mechanical prophylaxis necessary              [] Already on Anticoagulation                Anticipated Disposition upon discharge: [] Home                                                                         [] Home with Home Health                                                                         [] Matthew Carranza                                                                         [] [address]        Time Spent: 72 minutes    Electronically signed by Sukhjinder Dill MD on 10/2/2020 at 8:54 AM

## 2020-10-02 NOTE — CONSULTS
exertion  Gastrointestinal ROS: SEE HPI  Genito-Urinary ROS: no dysuria, trouble voiding, or hematuria  Musculoskeletal ROS: negative for - joint swelling or muscle pain      PHYSICAL EXAM:    Vitals:    10/02/20 1144   BP: (!) 176/84   Pulse: 75   Resp: 18   Temp: 97 °F (36.1 °C)   SpO2: 100%       General Appearance:  awake, alert, oriented, in no acute distress  Skin:  Skin color, texture, turgor normal. No rashes or lesions. Head/face:  NCAT  Eyes:  No gross abnormalities. Lungs:  Normal expansion. Clear to auscultation. No rales, rhonchi, or wheezing. Heart:  Heart regular rate and rhythm  Abdomen:  Obese. No surgical scars. Soft, nondistended. Not tender to palpation. No guarding  Extremities: Edema of the lower extremities      LABS:    CBC  Recent Labs     10/01/20  2317   WBC 10.2   HGB 13.8   HCT 40.9        BMP  Recent Labs     10/02/20  0100      K 4.2   CL 99   CO2 22   BUN 10   CREATININE 1.0   CALCIUM 9.0     Liver Function  Recent Labs     10/01/20  2317 10/02/20  0100   LIPASE 292*  --    BILITOT  --  0.6   AST  --  27   ALT  --  14   ALKPHOS  --  88   PROT  --  7.2   LABALBU  --  3.9     No results for input(s): LACTATE in the last 72 hours. No results for input(s): INR, PTT in the last 72 hours. Invalid input(s): PT    RADIOLOGY    Ct Abdomen Pelvis W Iv Contrast Additional Contrast? None    Result Date: 10/2/2020  EXAMINATION: CT OF THE ABDOMEN AND PELVIS WITH CONTRAST 10/2/2020 2:58 am TECHNIQUE: CT of the abdomen and pelvis was performed with the administration of intravenous contrast. Multiplanar reformatted images are provided for review. Dose modulation, iterative reconstruction, and/or weight based adjustment of the mA/kV was utilized to reduce the radiation dose to as low as reasonably achievable.  COMPARISON: 05/10/2016 HISTORY: ORDERING SYSTEM PROVIDED HISTORY: abdominal pain TECHNOLOGIST PROVIDED HISTORY: Reason for exam:->abdominal pain Additional Contrast?->None What reading provider will be dictating this exam?->CRC FINDINGS: Lower Chest:  Visualized portion of the lower chest demonstrates no acute abnormality. Organs: There is diffuse hepatic steatosis. No evidence of focal hepatic lesion. No biliary ductal dilatation. Portal vein is patent. Gallbladder demonstrates no acute abnormality. No evidence of splenomegaly or focal splenic lesion. No focal adrenal nodule. The kidneys enhance symmetrically without evidence of hydronephrosis. There is extensive inflammatory changes involving the head and body of the pancreas suggesting acute pancreatitis. There is peripancreatic inflammatory changes with multiple peripancreatic small lymph nodes. There is homogeneous enhancement of the pancreas. No pancreatic pseudocyst. GI/Bowel: Stomach and duodenal sweep demonstrate no acute abnormality. There is no evidence of bowel obstruction. No evidence of abnormal bowel wall thickening or distension. The appendix is visualized and is unremarkable. No evidence of acute appendicitis. Pelvis: Bladder and prostate demonstrate no acute abnormality. Peritoneum/Retroperitoneum: No ascites or free air. Aorta demonstrates normal caliber. No focal drainable fluid collection. Bones/Soft Tissues:  No acute abnormality of the visualized osseous structures. Mild lower lumbar spine disc degenerative changes. Findings consistent with acute uncomplicated pancreatitis. Severe hepatic steatosis. Us Gallbladder Ruq    Result Date: 10/1/2020  EXAMINATION: RIGHT UPPER QUADRANT ULTRASOUND 10/1/2020 9:38 pm COMPARISON: Correlation is made with a prior CT of the abdomen and pelvis from May 2016 HISTORY: [facility]: abdominal pain TECHNOLOGIST PROVIDED HISTORY: Reason for exam:->abdominal pain What reading provider will be dictating this exam?->CRC FINDINGS: Evaluation is limited by the patient's large body habitus. No evidence of gallstones.   No gallbladder wall

## 2020-10-02 NOTE — ED NOTES
Bed: 08  Expected date:   Expected time:   Means of arrival:   Comments:  triage     Melinda Munoz RN  10/01/20 0546

## 2020-10-02 NOTE — PROGRESS NOTES
Elisabeth in [facility] notified of STAT lab.     Electronically signed by Leonel Astudillo RN on 10/2/2020 at 6:52 PM

## 2020-10-02 NOTE — ED PROVIDER NOTES
HPI:  10/1/20, Time: 9:26 PM EDT         Nisreen Bella is a 48 y.o. male presenting to the ED for abdominal pain, beginning 2 weeks ago. The complaint has been persistent, moderate in severity, and worsened by nothing. Reporting nausea but no vomiting. Patient reporting no diarrhea. Patient reporting no black or tarry stools. Patient reporting no fever chills. Patient reports he was told in the past that he had gallbladder issues. Patient reporting no leg pain or swelling he reports no cough. He reports no chest pain. Patient reports he has had decreased appetite for the past week. He reports no lower abdominal pain. ROS:   Pertinent positives and negatives are stated within HPI, all other systems reviewed and are negative.  --------------------------------------------- PAST HISTORY ---------------------------------------------  Past Medical History:  has no past medical history on file. Past Surgical History:  has a past surgical history that includes Finger amputation and eye surgery. Social History:  reports that he has been smoking cigarettes. He has smoked for the past 20.00 years. He has never used smokeless tobacco. He reports that he does not drink alcohol or use drugs. Family History: family history is not on file. The patients home medications have been reviewed. Allergies: Patient has no known allergies. ---------------------------------------------------PHYSICAL EXAM--------------------------------------    Constitutional/General: Alert and oriented x3, well appearing, non toxic in NAD  Head: Normocephalic and atraumatic  Eyes: PERRL, EOMI  Mouth: Oropharynx clear, handling secretions, no trismus  Neck: Supple, full ROM, non tender to palpation in the midline, no stridor, no crepitus, no meningeal signs  Pulmonary: Lungs clear to auscultation bilaterally, no wheezes, rales, or rhonchi. Not in respiratory distress  Cardiovascular:  Regular rate. Regular rhythm.  No murmurs, gallops, or rubs. 2+ distal pulses  Chest: no chest wall tenderness  Abdomen: Soft. Non tender. Non distended. +BS. No rebound, guarding, or rigidity. No pulsatile masses appreciated. Musculoskeletal: Moves all extremities x 4. Warm and well perfused, no clubbing, cyanosis, or edema. Capillary refill <3 seconds  Skin: warm and dry. No rashes. Neurologic: GCS 15, CN 2-12 grossly intact, no focal deficits, symmetric strength 5/5 in the upper and lower extremities bilaterally  Psych: Normal Affect    -------------------------------------------------- RESULTS -------------------------------------------------  I have personally reviewed all laboratory and imaging results for this patient. Results are listed below.      LABS:  Results for orders placed or performed during the hospital encounter of 10/01/20   CBC auto differential   Result Value Ref Range    WBC 10.2 4.5 - 11.5 E9/L    RBC 4.61 3.80 - 5.80 E12/L    Hemoglobin 13.8 12.5 - 16.5 g/dL    Hematocrit 40.9 37.0 - 54.0 %    MCV 88.7 80.0 - 99.9 fL    MCH 29.9 26.0 - 35.0 pg    MCHC 33.7 32.0 - 34.5 %    RDW 14.1 11.5 - 15.0 fL    Platelets 916 161 - 746 E9/L    MPV 11.2 7.0 - 12.0 fL    Neutrophils % 53.3 43.0 - 80.0 %    Immature Granulocytes % 0.2 0.0 - 5.0 %    Lymphocytes % 37.1 20.0 - 42.0 %    Monocytes % 8.1 2.0 - 12.0 %    Eosinophils % 0.9 0.0 - 6.0 %    Basophils % 0.4 0.0 - 2.0 %    Neutrophils Absolute 5.41 1.80 - 7.30 E9/L    Immature Granulocytes # 0.02 E9/L    Lymphocytes Absolute 3.77 1.50 - 4.00 E9/L    Monocytes Absolute 0.82 0.10 - 0.95 E9/L    Eosinophils Absolute 0.09 0.05 - 0.50 E9/L    Basophils Absolute 0.04 0.00 - 0.20 E9/L   Lipase   Result Value Ref Range    Lipase 292 (H) 13 - 60 U/L   Lactic Acid, Plasma   Result Value Ref Range    Lactic Acid 0.8 0.5 - 2.2 mmol/L   Troponin   Result Value Ref Range    Troponin <0.01 0.00 - 0.03 ng/mL   Comprehensive Metabolic Panel   Result Value Ref Range    Sodium 135 132 - 146 mmol/L Potassium 4.2 3.5 - 5.0 mmol/L    Chloride 99 98 - 107 mmol/L    CO2 22 22 - 29 mmol/L    Anion Gap 14 7 - 16 mmol/L    Glucose 88 74 - 99 mg/dL    BUN 10 6 - 20 mg/dL    CREATININE 1.0 0.7 - 1.2 mg/dL    GFR Non-African American >60 >=60 mL/min/1.73    GFR African American >60     Calcium 9.0 8.6 - 10.2 mg/dL    Total Protein 7.2 6.4 - 8.3 g/dL    Alb 3.9 3.5 - 5.2 g/dL    Total Bilirubin 0.6 0.0 - 1.2 mg/dL    Alkaline Phosphatase 88 40 - 129 U/L    ALT 14 0 - 40 U/L    AST 27 0 - 39 U/L   SPECIMEN REJECTION   Result Value Ref Range    Rejected Test cmp, trop     Reason for Rejection see below    EKG 12 Lead   Result Value Ref Range    Ventricular Rate 74 BPM    Atrial Rate 74 BPM    P-R Interval 170 ms    QRS Duration 104 ms    Q-T Interval 402 ms    QTc Calculation (Bazett) 446 ms    P Axis 35 degrees    R Axis 8 degrees    T Axis 35 degrees       RADIOLOGY:  Interpreted by Radiologist.  CT ABDOMEN PELVIS W IV CONTRAST Additional Contrast? None   Final Result   Findings consistent with acute uncomplicated pancreatitis. Severe hepatic steatosis. US GALLBLADDER RUQ   Final Result   No evidence of cholelithiasis or signs of cholecystitis. The common bile duct is dilated measuring approximately 9 mm. Distal   obstructive process cannot be excluded. RECOMMENDATIONS:   Consider further evaluation with CT of the abdomen and pelvis. EKG Interpretation      EKG: This EKG is signed and interpreted by me. Rate: 74  Rhythm: Sinus  Interpretation: no acute changes  Comparison: no previous EKG available    ------------------------- NURSING NOTES AND VITALS REVIEWED ---------------------------   The nursing notes within the ED encounter and vital signs as below have been reviewed by myself.   BP (!) 162/105   Pulse 88   Temp 98.3 °F (36.8 °C)   Resp 20   Ht 6' (1.829 m)   Wt (!) 400 lb (181.4 kg)   SpO2 98%   BMI 54.25 kg/m²   Oxygen Saturation Interpretation: Normal    The

## 2020-10-02 NOTE — CARE COORDINATION
Patient is here under observation for Acute metabolic pancreatitis. Internal med plan, Bowel rest, n.p.o. except for sips of water and medications; Ringer's lactate at 125 cc an hour; nonopiate pain control; follow lipase. Absolute need weight reduction with metabolic acute pancreatitis and severe hepatic steatosis he has Morbid obesity with BMI of 50.0-59.9, adult. Cm/Sw following for any discharge needs.   Pavan Armenta RN CM

## 2020-10-03 PROBLEM — I10 UNCONTROLLED HYPERTENSION: Status: ACTIVE | Noted: 2020-10-03

## 2020-10-03 LAB
ALBUMIN SERPL-MCNC: 3.8 G/DL (ref 3.5–5.2)
ALP BLD-CCNC: 84 U/L (ref 40–129)
ALT SERPL-CCNC: 16 U/L (ref 0–40)
ANION GAP SERPL CALCULATED.3IONS-SCNC: 13 MMOL/L (ref 7–16)
AST SERPL-CCNC: 27 U/L (ref 0–39)
BASOPHILS ABSOLUTE: 0.04 E9/L (ref 0–0.2)
BASOPHILS RELATIVE PERCENT: 0.5 % (ref 0–2)
BILIRUB SERPL-MCNC: 0.6 MG/DL (ref 0–1.2)
BILIRUBIN DIRECT: <0.2 MG/DL (ref 0–0.3)
BILIRUBIN, INDIRECT: NORMAL MG/DL (ref 0–1)
BUN BLDV-MCNC: 9 MG/DL (ref 6–20)
CALCIUM SERPL-MCNC: 9.2 MG/DL (ref 8.6–10.2)
CHLORIDE BLD-SCNC: 100 MMOL/L (ref 98–107)
CO2: 22 MMOL/L (ref 22–29)
CREAT SERPL-MCNC: 0.9 MG/DL (ref 0.7–1.2)
EOSINOPHILS ABSOLUTE: 0.12 E9/L (ref 0.05–0.5)
EOSINOPHILS RELATIVE PERCENT: 1.6 % (ref 0–6)
GFR AFRICAN AMERICAN: >60
GFR NON-AFRICAN AMERICAN: >60 ML/MIN/1.73
GLUCOSE BLD-MCNC: 74 MG/DL (ref 74–99)
HCT VFR BLD CALC: 40.7 % (ref 37–54)
HEMOGLOBIN: 13.3 G/DL (ref 12.5–16.5)
IMMATURE GRANULOCYTES #: 0.01 E9/L
IMMATURE GRANULOCYTES %: 0.1 % (ref 0–5)
LIPASE: 117 U/L (ref 13–60)
LYMPHOCYTES ABSOLUTE: 2.89 E9/L (ref 1.5–4)
LYMPHOCYTES RELATIVE PERCENT: 38 % (ref 20–42)
MCH RBC QN AUTO: 29.4 PG (ref 26–35)
MCHC RBC AUTO-ENTMCNC: 32.7 % (ref 32–34.5)
MCV RBC AUTO: 90 FL (ref 80–99.9)
MONOCYTES ABSOLUTE: 0.66 E9/L (ref 0.1–0.95)
MONOCYTES RELATIVE PERCENT: 8.7 % (ref 2–12)
NEUTROPHILS ABSOLUTE: 3.88 E9/L (ref 1.8–7.3)
NEUTROPHILS RELATIVE PERCENT: 51.1 % (ref 43–80)
PDW BLD-RTO: 13.9 FL (ref 11.5–15)
PLATELET # BLD: 269 E9/L (ref 130–450)
PMV BLD AUTO: 11 FL (ref 7–12)
POTASSIUM REFLEX MAGNESIUM: 4.4 MMOL/L (ref 3.5–5)
RBC # BLD: 4.52 E12/L (ref 3.8–5.8)
SODIUM BLD-SCNC: 135 MMOL/L (ref 132–146)
TOTAL PROTEIN: 7.1 G/DL (ref 6.4–8.3)
WBC # BLD: 7.6 E9/L (ref 4.5–11.5)

## 2020-10-03 PROCEDURE — 83690 ASSAY OF LIPASE: CPT

## 2020-10-03 PROCEDURE — 99253 IP/OBS CNSLTJ NEW/EST LOW 45: CPT | Performed by: SURGERY

## 2020-10-03 PROCEDURE — 6360000002 HC RX W HCPCS: Performed by: INTERNAL MEDICINE

## 2020-10-03 PROCEDURE — 2580000003 HC RX 258: Performed by: INTERNAL MEDICINE

## 2020-10-03 PROCEDURE — 1200000000 HC SEMI PRIVATE

## 2020-10-03 PROCEDURE — 36415 COLL VENOUS BLD VENIPUNCTURE: CPT

## 2020-10-03 PROCEDURE — 80048 BASIC METABOLIC PNL TOTAL CA: CPT

## 2020-10-03 PROCEDURE — 6370000000 HC RX 637 (ALT 250 FOR IP): Performed by: INTERNAL MEDICINE

## 2020-10-03 PROCEDURE — 85025 COMPLETE CBC W/AUTO DIFF WBC: CPT

## 2020-10-03 PROCEDURE — C9113 INJ PANTOPRAZOLE SODIUM, VIA: HCPCS | Performed by: INTERNAL MEDICINE

## 2020-10-03 PROCEDURE — 80076 HEPATIC FUNCTION PANEL: CPT

## 2020-10-03 PROCEDURE — 2500000003 HC RX 250 WO HCPCS: Performed by: INTERNAL MEDICINE

## 2020-10-03 PROCEDURE — 99233 SBSQ HOSP IP/OBS HIGH 50: CPT | Performed by: INTERNAL MEDICINE

## 2020-10-03 RX ORDER — LISINOPRIL 10 MG/1
10 TABLET ORAL 2 TIMES DAILY
Status: DISCONTINUED | OUTPATIENT
Start: 2020-10-03 | End: 2020-10-04

## 2020-10-03 RX ORDER — METOPROLOL SUCCINATE 25 MG/1
25 TABLET, EXTENDED RELEASE ORAL 2 TIMES DAILY
Status: DISCONTINUED | OUTPATIENT
Start: 2020-10-03 | End: 2020-10-05

## 2020-10-03 RX ADMIN — LISINOPRIL 10 MG: 10 TABLET ORAL at 22:42

## 2020-10-03 RX ADMIN — PANTOPRAZOLE SODIUM 40 MG: 40 INJECTION, POWDER, FOR SOLUTION INTRAVENOUS at 10:59

## 2020-10-03 RX ADMIN — SODIUM CHLORIDE, PRESERVATIVE FREE 10 ML: 5 INJECTION INTRAVENOUS at 11:00

## 2020-10-03 RX ADMIN — METOPROLOL SUCCINATE 25 MG: 25 TABLET, EXTENDED RELEASE ORAL at 15:30

## 2020-10-03 RX ADMIN — LISINOPRIL 10 MG: 10 TABLET ORAL at 15:30

## 2020-10-03 RX ADMIN — DOXAZOSIN 1 MG: 2 TABLET ORAL at 22:42

## 2020-10-03 RX ADMIN — ACETAMINOPHEN 650 MG: 325 TABLET, FILM COATED ORAL at 00:29

## 2020-10-03 RX ADMIN — ENOXAPARIN SODIUM 40 MG: 40 INJECTION SUBCUTANEOUS at 10:59

## 2020-10-03 RX ADMIN — LABETALOL HYDROCHLORIDE 20 MG: 5 INJECTION INTRAVENOUS at 11:12

## 2020-10-03 RX ADMIN — SODIUM CHLORIDE, PRESERVATIVE FREE 10 ML: 5 INJECTION INTRAVENOUS at 22:43

## 2020-10-03 RX ADMIN — SODIUM CHLORIDE, PRESERVATIVE FREE 10 ML: 5 INJECTION INTRAVENOUS at 11:01

## 2020-10-03 RX ADMIN — METOPROLOL SUCCINATE 25 MG: 25 TABLET, EXTENDED RELEASE ORAL at 22:42

## 2020-10-03 ASSESSMENT — PAIN SCALES - GENERAL
PAINLEVEL_OUTOF10: 0

## 2020-10-03 NOTE — PLAN OF CARE
Problem: Activity:  Goal: Risk for activity intolerance will decrease  Description: Risk for activity intolerance will decrease  Outcome: Met This Shift     Problem:  Bowel/Gastric:  Goal: Bowel function will improve  Description: Bowel function will improve  Outcome: Met This Shift     Problem: Fluid Volume:  Goal: Maintenance of adequate hydration will improve  Description: Maintenance of adequate hydration will improve  Outcome: Met This Shift

## 2020-10-03 NOTE — PROGRESS NOTES
Hospitalist Progress Note  [facility]       Patient: Laureano Stanley  Unit/Bed: 5240/9253-D  YOB: 1970  MRN: [id number]  Acct: [de-identified]   AdmittingDiagnosis: Acute pancreatitis, unspecified complication status, unspecified pancreatitis type [T36.75]  Metabolic pancreatitis [D67.69]  Uncontrolled hypertension [I10]  Admit Date: 10/1/2020  Hospital Day: 1    Subjective:    Patient is having problems with abdominal pain    Patient Seen, Chart, Labs, Radiology studies, and Consults reviewed. Objective:   BP (!) 164/92   Pulse 85   Temp 98.4 °F (36.9 °C) (Oral)   Resp 18   Ht 6' (1.829 m)   Wt (!) 400 lb (181.4 kg)   SpO2 96%   BMI 54.25 kg/m²       Intake/Output Summary (Last 24 hours) at 10/3/2020 1222  Last data filed at 10/3/2020 1506  Gross per 24 hour   Intake 2376 ml   Output --   Net 2376 ml       Physical Exam  Constitutional/General: Alert and oriented x3, well appearing, non toxic in NAD; comfortable; massive stature - morbidly obese; [place] BMI 54.3. Head: Normocephalic and atraumatic  Eyes: PERRL, EOMI  Mouth: Oropharynx clear, handling secretions, no trismus  Neck: Supple, full ROM, non tender to palpation in the midline, no stridor, no crepitus, no meningeal signs  Pulmonary: Lungs clear to auscultation bilaterally, no wheezes, rales, or rhonchi. Not in respiratory distress  Cardiovascular:  Regular rate. Regular rhythm. No murmurs, gallops, or rubs. 2+ distal pulses  Chest: no chest wall tenderness  Abdomen: Soft.    Difficult to discern tenderness. Non distended.  +BS.  No rebound, guarding, or rigidity. No pulsatile masses appreciated. Massively protuberant abdomen with large pannus over lying pubis and groin. Musculoskeletal: Moves all extremities x 4. Warm and well perfused, no clubbing, cyanosis, or edema. Capillary refill <3 seconds  Skin: warm and dry. No rashes.    Neurologic: GCS 15, CN 2-12 grossly intact, no focal deficits, symmetric strength 5/5 in the upper and lower extremities bilaterally  Psych: Normal Affect     Farias:No  Drains:No  Central Line/port:No   EKG:Yes Normal sinus rhythm rate 74 bpm.  Normal intervals normal axis deviation normal ECG. Imaging:   Ct Abdomen Pelvis W Iv Contrast Additional Contrast? None Date: 10/2/2020  EXAMINATION: CT OF THE ABDOMEN AND PELVIS WITH CONTRAST 10/2/2020 2:58 am TECHNIQUE: CT of the abdomen and pelvis was performed with the administration of intravenous contrast. Multiplanar reformatted images are provided for review. Dose modulation, iterative reconstruction, and/or weight based adjustment of the mA/kV was utilized to reduce the radiation dose to as low as reasonably achievable. COMPARISON: 05/10/2016 HISTORY: ORDERING SYSTEM PROVIDED HISTORY: abdominal pain TECHNOLOGIST PROVIDED HISTORY: Reason for exam:->abdominal pain Additional Contrast?->None What reading provider will be dictating this exam?->CRC FINDINGS: Lower Chest:  Visualized portion of the lower chest demonstrates no acute abnormality. Organs: There is diffuse hepatic steatosis. No evidence of focal hepatic lesion. No biliary ductal dilatation. Portal vein is patent. Gallbladder demonstrates no acute abnormality. No evidence of splenomegaly or focal splenic lesion. No focal adrenal nodule. The kidneys enhance symmetrically without evidence of hydronephrosis. There is extensive inflammatory changes involving the head and body of the pancreas suggesting acute pancreatitis. There is peripancreatic inflammatory changes with multiple peripancreatic small lymph nodes. There is homogeneous enhancement of the pancreas. No pancreatic pseudocyst. GI/Bowel: Stomach and duodenal sweep demonstrate no acute abnormality. There is no evidence of bowel obstruction. No evidence of abnormal bowel wall thickening or distension. The appendix is visualized and is unremarkable. No evidence of acute appendicitis.  Pelvis: Bladder and prostate demonstrate no acute abnormality. Peritoneum/Retroperitoneum: No ascites or free air. Aorta demonstrates normal caliber. No focal drainable fluid collection. Bones/Soft Tissues:  No acute abnormality of the visualized osseous structures. Mild lower lumbar spine disc degenerative changes. Findings consistent with acute uncomplicated pancreatitis. Severe hepatic steatosis. Us Gallbladder Ruq  Date: 10/1/2020  EXAMINATION: RIGHT UPPER QUADRANT ULTRASOUND 10/1/2020 9:38 pm COMPARISON: Correlation is made with a prior CT of the abdomen and pelvis from May 2016 HISTORY: [address]: abdominal pain TECHNOLOGIST PROVIDED HISTORY: Reason for exam:->abdominal pain What reading provider will be dictating this exam?->CRC FINDINGS: Evaluation is limited by the patient's large body habitus. No evidence of gallstones. No gallbladder wall thickening or pericholecystic fluid. The common bile duct appears dilated measuring approximately 9 mm. Pancreas not imaged on this examination. No evidence of cholelithiasis or signs of cholecystitis. The common bile duct is dilated measuring approximately 9 mm. Distal obstructive process cannot be excluded. RECOMMENDATIONS: Consider further evaluation with CT of the abdomen and pelvis.        Diet:  DIET CLEAR LIQUID; No Added Salt (3-4 GM)    Medications:    doxazosin  1 mg Oral Nightly    sodium chloride flush  10 mL Intravenous 2 times per day    pantoprazole  40 mg Intravenous Daily    And    sodium chloride (PF)  10 mL Intravenous Daily    enoxaparin  40 mg Subcutaneous Daily     Continuous Infusions:   lactated ringers 100 mL/hr at 10/03/20 1101     PRNMeds:sodium chloride flush, acetaminophen **OR** acetaminophen, polyethylene glycol, ondansetron, promethazine **OR** [DISCONTINUED] ondansetron, hydrALAZINE, amLODIPine, labetalol  DVT Prophylaxis:pharmacologic prophylaxis (with any of the following: enoxaparin (Lovenox) 40mg SQ 2h prior to surgery then QD) and Encourage ambulation, low risk for DVT, no chemical or mechanical prophylaxis necessary    Data:  CBC:  Recent Labs     10/01/20  2317 10/03/20  0639   WBC 10.2 7.6   RBC 4.61 4.52   HGB 13.8 13.3   HCT 40.9 40.7   MCV 88.7 90.0   RDW 14.1 13.9    269     BMP:  Recent Labs     10/02/20  0100 10/03/20  0639    135   K 4.2 4.4   CL 99 100   CO2 22 22   BUN 10 9   CREATININE 1.0 0.9     BNP: No results for input(s): BNP in the last 72 hours. PT/INR: No results for input(s): PROTIME, INR in the last 72 hours.  : No results for input(s): APTT in the last 72 hours. CARDIAC ENZYMES:No results for input(s): CKMB, CKMBINDEX, TROPONINT in the last 72 hours. Invalid input(s): CKTOTAL;3  FASTING LIPID PANEL:  Lab Results   Component Value Date    CHOL 166 10/02/2020    HDL 32 10/02/2020    TRIG 96 10/02/2020     LIVER PROFILE:   Recent Labs     10/02/20  0100 10/03/20  0639   AST 27 27   ALT 14 16   BILIDIR  --  <0.2   BILITOT 0.6 0.6   ALKPHOS 88 84      ABGs: No results found for: PH, PCO2, PO2, HCO3, O2SAT    Assessment/Plan:  Principal Problem:    Pancreatitis, unspecified pancreatitis type  Active Problems:    Metabolic pancreatitis    Poorly-controlled hypertension    Uncontrolled hypertension    Morbid obesity with BMI of 50.0-59.9, adult (HCC)    Non-alcoholic fatty liver disease  Resolved Problems:    * No resolved hospital problems. *    Summary: Mr. Antonio Nelson a 48 y. o. male presented to the [facility] ED for mid upper abdominal pain [no lower abdominal pain], beginning 2 weeks ago associated with decreased appetite for the past week and nausea but no vomiting, diarrhea, black or tarry stools. Patient was told in the past that he had \"gallbladder issues\" 2-year ago. Diagnosis and management:     . # AcPancr/MetPancr: Likely pancreatitis due to obesity. CT acute uncomplicated pancreatitis. Severe hepatic steatosis.  RUQ US: No cholelithiasis normal gallbladder normal biliary tree and common bile duct

## 2020-10-03 NOTE — PROGRESS NOTES
Spoke to pharmacy re: availability of IV hydralazine? The pharmacist states it is on manufacturing backorder, so the medication is limited, but she is going to send a dose for this patient. Message left with Dr. Juaquin Cardoso answering service re: high blood pressure, pharmacy may not be able to send PRN IV hydralazine overnight.     Electronically signed by Courtney Cherry RN on 10/2/2020 at 8:59 PM

## 2020-10-04 LAB
ALBUMIN SERPL-MCNC: 3.8 G/DL (ref 3.5–5.2)
ALP BLD-CCNC: 83 U/L (ref 40–129)
ALT SERPL-CCNC: 15 U/L (ref 0–40)
ANION GAP SERPL CALCULATED.3IONS-SCNC: 10 MMOL/L (ref 7–16)
AST SERPL-CCNC: 24 U/L (ref 0–39)
BILIRUB SERPL-MCNC: 0.6 MG/DL (ref 0–1.2)
BILIRUBIN DIRECT: <0.2 MG/DL (ref 0–0.3)
BILIRUBIN, INDIRECT: NORMAL MG/DL (ref 0–1)
BUN BLDV-MCNC: 8 MG/DL (ref 6–20)
CALCIUM SERPL-MCNC: 9.3 MG/DL (ref 8.6–10.2)
CHLORIDE BLD-SCNC: 106 MMOL/L (ref 98–107)
CO2: 23 MMOL/L (ref 22–29)
CREAT SERPL-MCNC: 1 MG/DL (ref 0.7–1.2)
GFR AFRICAN AMERICAN: >60
GFR NON-AFRICAN AMERICAN: >60 ML/MIN/1.73
GLUCOSE BLD-MCNC: 102 MG/DL (ref 74–99)
IGA: 301 MG/DL (ref 70–400)
IGG: 1243 MG/DL (ref 700–1600)
IGM: 80 MG/DL (ref 40–230)
LIPASE: 72 U/L (ref 13–60)
POTASSIUM SERPL-SCNC: 4.1 MMOL/L (ref 3.5–5)
SODIUM BLD-SCNC: 139 MMOL/L (ref 132–146)
TOTAL PROTEIN: 7 G/DL (ref 6.4–8.3)

## 2020-10-04 PROCEDURE — 83690 ASSAY OF LIPASE: CPT

## 2020-10-04 PROCEDURE — 36415 COLL VENOUS BLD VENIPUNCTURE: CPT

## 2020-10-04 PROCEDURE — 1200000000 HC SEMI PRIVATE

## 2020-10-04 PROCEDURE — 82784 ASSAY IGA/IGD/IGG/IGM EACH: CPT

## 2020-10-04 PROCEDURE — 80048 BASIC METABOLIC PNL TOTAL CA: CPT

## 2020-10-04 PROCEDURE — 80076 HEPATIC FUNCTION PANEL: CPT

## 2020-10-04 PROCEDURE — 6360000002 HC RX W HCPCS: Performed by: INTERNAL MEDICINE

## 2020-10-04 PROCEDURE — 2580000003 HC RX 258: Performed by: INTERNAL MEDICINE

## 2020-10-04 PROCEDURE — 6370000000 HC RX 637 (ALT 250 FOR IP): Performed by: INTERNAL MEDICINE

## 2020-10-04 PROCEDURE — 99233 SBSQ HOSP IP/OBS HIGH 50: CPT | Performed by: INTERNAL MEDICINE

## 2020-10-04 PROCEDURE — 6370000000 HC RX 637 (ALT 250 FOR IP): Performed by: STUDENT IN AN ORGANIZED HEALTH CARE EDUCATION/TRAINING PROGRAM

## 2020-10-04 RX ORDER — LISINOPRIL 10 MG/1
10 TABLET ORAL ONCE
Status: COMPLETED | OUTPATIENT
Start: 2020-10-04 | End: 2020-10-04

## 2020-10-04 RX ORDER — SPIRONOLACTONE 25 MG/1
25 TABLET ORAL DAILY
Status: DISCONTINUED | OUTPATIENT
Start: 2020-10-04 | End: 2020-10-05 | Stop reason: HOSPADM

## 2020-10-04 RX ORDER — HYDRALAZINE HYDROCHLORIDE 25 MG/1
25 TABLET, FILM COATED ORAL EVERY 8 HOURS SCHEDULED
Status: DISCONTINUED | OUTPATIENT
Start: 2020-10-04 | End: 2020-10-05

## 2020-10-04 RX ORDER — PANTOPRAZOLE SODIUM 40 MG/1
40 TABLET, DELAYED RELEASE ORAL
Status: DISCONTINUED | OUTPATIENT
Start: 2020-10-04 | End: 2020-10-05 | Stop reason: HOSPADM

## 2020-10-04 RX ORDER — LISINOPRIL 20 MG/1
20 TABLET ORAL 2 TIMES DAILY
Status: DISCONTINUED | OUTPATIENT
Start: 2020-10-04 | End: 2020-10-05

## 2020-10-04 RX ADMIN — LISINOPRIL 10 MG: 10 TABLET ORAL at 11:24

## 2020-10-04 RX ADMIN — HYDRALAZINE HYDROCHLORIDE 25 MG: 25 TABLET, FILM COATED ORAL at 14:24

## 2020-10-04 RX ADMIN — METOPROLOL SUCCINATE 25 MG: 25 TABLET, EXTENDED RELEASE ORAL at 11:23

## 2020-10-04 RX ADMIN — LISINOPRIL 20 MG: 20 TABLET ORAL at 21:57

## 2020-10-04 RX ADMIN — METOPROLOL SUCCINATE 25 MG: 25 TABLET, EXTENDED RELEASE ORAL at 21:57

## 2020-10-04 RX ADMIN — SODIUM CHLORIDE, PRESERVATIVE FREE 10 ML: 5 INJECTION INTRAVENOUS at 21:58

## 2020-10-04 RX ADMIN — LISINOPRIL 10 MG: 10 TABLET ORAL at 11:22

## 2020-10-04 RX ADMIN — AMLODIPINE BESYLATE 2.5 MG: 2.5 TABLET ORAL at 11:22

## 2020-10-04 RX ADMIN — SPIRONOLACTONE 25 MG: 25 TABLET ORAL at 11:22

## 2020-10-04 RX ADMIN — ENOXAPARIN SODIUM 40 MG: 40 INJECTION SUBCUTANEOUS at 11:21

## 2020-10-04 RX ADMIN — HYDRALAZINE HYDROCHLORIDE 25 MG: 25 TABLET, FILM COATED ORAL at 21:57

## 2020-10-04 RX ADMIN — PANTOPRAZOLE SODIUM 40 MG: 40 TABLET, DELAYED RELEASE ORAL at 11:23

## 2020-10-04 RX ADMIN — SODIUM CHLORIDE, PRESERVATIVE FREE 10 ML: 5 INJECTION INTRAVENOUS at 11:21

## 2020-10-04 ASSESSMENT — PAIN SCALES - GENERAL
PAINLEVEL_OUTOF10: 0
PAINLEVEL_OUTOF10: 0

## 2020-10-04 NOTE — PLAN OF CARE
Problem:  Bowel/Gastric:  Goal: Bowel function will improve  Description: Bowel function will improve  10/4/2020 0345 by Jackelyn Mederos  Outcome: Met This Shift     Problem: Fluid Volume:  Goal: Maintenance of adequate hydration will improve  Description: Maintenance of adequate hydration will improve  10/4/2020 0345 by Jackelyn Mederos  Outcome: Met This Shift     Problem: Sensory:  Goal: Pain level will decrease  Description: Pain level will decrease  Outcome: Met This Shift     Problem: Pain:  Goal: Pain level will decrease  Description: Pain level will decrease  Outcome: Met This Shift     Problem: Pain:  Goal: Control of acute pain  Description: Control of acute pain  Outcome: Met This Shift     Problem: Pain:  Goal: Control of acute pain  Description: Control of acute pain  Outcome: Met This Shift     Problem: Pain:  Goal: Control of chronic pain  Description: Control of chronic pain  Outcome: Met This Shift

## 2020-10-04 NOTE — PROGRESS NOTES
Hospitalist Progress Note  [facility]       Patient: Sofie Sosa  Unit/Bed: 9443/2875-O  YOB: 1970  MRN: [id number]  Acct: [de-identified]   AdmittingDiagnosis: Acute pancreatitis, unspecified complication status, unspecified pancreatitis type [E61.52]  Metabolic pancreatitis [G25.18]  Uncontrolled hypertension [I10]  Admit Date: 10/1/2020  Hospital Day: 2    Subjective:    Patient is having problems with abdominal pain    Patient Seen, Chart, Labs, Radiology studies, and Consults reviewed. Objective:   BP (!) 160/80   Pulse 72   Temp 98.2 °F (36.8 °C) (Temporal)   Resp 18   Ht 6' (1.829 m)   Wt (!) 400 lb (181.4 kg)   SpO2 98%   BMI 54.25 kg/m²     No intake or output data in the 24 hours ending 10/04/20 0949    Physical Exam  Constitutional/General: Alert and oriented x3, well appearing, non toxic in NAD; comfortable; massive stature - morbidly obese; [place] BMI 54.3. Head: Normocephalic and atraumatic  Eyes: PERRL, EOMI  Mouth: Oropharynx clear, handling secretions, no trismus  Neck: Supple, full ROM, non tender to palpation in the midline, no stridor, no crepitus, no meningeal signs  Pulmonary: Lungs clear to auscultation bilaterally, no wheezes, rales, or rhonchi. Not in respiratory distress  Cardiovascular:  Regular rate. Regular rhythm. No murmurs, gallops, or rubs. 2+ distal pulses  Chest: no chest wall tenderness  Abdomen: Soft.    Difficult to discern tenderness. Non distended.  +BS.  No rebound, guarding, or rigidity. No pulsatile masses appreciated. Massively protuberant abdomen with large pannus over lying pubis and groin. Musculoskeletal: Moves all extremities x 4. Warm and well perfused, no clubbing, cyanosis, or edema. Capillary refill <3 seconds  Skin: warm and dry. No rashes.    Neurologic: GCS 15, CN 2-12 grossly intact, no focal deficits, symmetric strength 5/5 in the upper and lower extremities bilaterally  Psych: Normal Affect     Farias:No  Drains:No  Central Line/port:No   EKG:Yes Normal sinus rhythm rate 74 bpm.  Normal intervals normal axis deviation normal ECG. Imaging:   Ct Abdomen Pelvis W Iv Contrast Additional Contrast? None Date: 10/2/2020  EXAMINATION: CT OF THE ABDOMEN AND PELVIS WITH CONTRAST 10/2/2020 2:58 am TECHNIQUE: CT of the abdomen and pelvis was performed with the administration of intravenous contrast. Multiplanar reformatted images are provided for review. Dose modulation, iterative reconstruction, and/or weight based adjustment of the mA/kV was utilized to reduce the radiation dose to as low as reasonably achievable. COMPARISON: 05/10/2016 HISTORY: ORDERING SYSTEM PROVIDED HISTORY: abdominal pain TECHNOLOGIST PROVIDED HISTORY: Reason for exam:->abdominal pain Additional Contrast?->None What reading provider will be dictating this exam?->CRC FINDINGS: Lower Chest:  Visualized portion of the lower chest demonstrates no acute abnormality. Organs: There is diffuse hepatic steatosis. No evidence of focal hepatic lesion. No biliary ductal dilatation. Portal vein is patent. Gallbladder demonstrates no acute abnormality. No evidence of splenomegaly or focal splenic lesion. No focal adrenal nodule. The kidneys enhance symmetrically without evidence of hydronephrosis. There is extensive inflammatory changes involving the head and body of the pancreas suggesting acute pancreatitis. There is peripancreatic inflammatory changes with multiple peripancreatic small lymph nodes. There is homogeneous enhancement of the pancreas. No pancreatic pseudocyst. GI/Bowel: Stomach and duodenal sweep demonstrate no acute abnormality. There is no evidence of bowel obstruction. No evidence of abnormal bowel wall thickening or distension. The appendix is visualized and is unremarkable. No evidence of acute appendicitis. Pelvis: Bladder and prostate demonstrate no acute abnormality.  Peritoneum/Retroperitoneum: No ascites or Encourage ambulation, low risk for DVT, no chemical or mechanical prophylaxis necessary    Data:  CBC:  Recent Labs     10/01/20  2317 10/03/20  0639   WBC 10.2 7.6   RBC 4.61 4.52   HGB 13.8 13.3   HCT 40.9 40.7   MCV 88.7 90.0   RDW 14.1 13.9    269     BMP:  Recent Labs     10/02/20  0100 10/03/20  0639 10/04/20  0551    135 139   K 4.2 4.4 4.1   CL 99 100 106   CO2 22 22 23   BUN 10 9 8   CREATININE 1.0 0.9 1.0     BNP: No results for input(s): BNP in the last 72 hours. PT/INR: No results for input(s): PROTIME, INR in the last 72 hours.  : No results for input(s): APTT in the last 72 hours. CARDIAC ENZYMES:No results for input(s): CKMB, CKMBINDEX, TROPONINT in the last 72 hours. Invalid input(s): CKTOTAL;3  FASTING LIPID PANEL:  Lab Results   Component Value Date    CHOL 166 10/02/2020    HDL 32 10/02/2020    TRIG 96 10/02/2020     LIVER PROFILE:   Recent Labs     10/02/20  0100 10/03/20  0639 10/04/20  0551   AST 27 27 24   ALT 14 16 15   BILIDIR  --  <0.2 <0.2   BILITOT 0.6 0.6 0.6   ALKPHOS 88 84 83      ABGs: No results found for: PH, PCO2, PO2, HCO3, O2SAT    Assessment/Plan:  Principal Problem:    Pancreatitis, unspecified pancreatitis type  Active Problems:    Metabolic pancreatitis    Poorly-controlled hypertension    Uncontrolled hypertension    Morbid obesity with BMI of 50.0-59.9, adult (HCC)    Non-alcoholic fatty liver disease  Resolved Problems:    * No resolved hospital problems. *    Summary: Mr. Ravi Mejias a 48 y. o. male presented to the [facility] ED for mid upper abdominal pain [no lower abdominal pain], beginning 2 weeks ago associated with decreased appetite for the past week and nausea but no vomiting, diarrhea, black or tarry stools. Patient was told in the past that he had \"gallbladder issues\" 2-year ago. Diagnosis and management:     . # AcPancr/MetPancr/MO: Likely pancreatitis due to obesity. CT acute uncomplicated pancreatitis. Severe hepatic steatosis.  RUQ US: No cholelithiasis normal gallbladder normal biliary tree and common bile duct size. - [10/2]  - Bowel rest, n.p.o. except for sips of water and medications. Ringer's lactate at 125 cc an hour; nonopiate pain control; lipase  - Absolute need weight reduction with metabolic acute pancreatitis and severe hepatic steatosis  - [10/3-10/4]   No pain noted since [10/3]. Advanced liquid to GI soft [10/3], low fat;Ringer's lactate at 125 cc/h-> stopped [10/3]. Lipase 117-> 72  . # TN:   - [10/2]   /100. On doxazosin 1mg/hs. Started hydralazine 25 mg p.o.x1; hydralazine 10 mg IV as needed for sbp > 180, dbp greater than 100.  - [10/3]   -187/, add lisinopril 10 mg bid, metoprolol 25 mg bid; may stop tomorrow Doxazosin. Renal function normal.   - [10/4]   -173/80-90, inc lisinopril 20 mg bid, metoprolol 25 mg bid; add hydralazine 25mg tid and aldactone 25mg/d, stop Doxazosin/Cardura. Renal function normal: cr/gfr 0.9/>60.      Time Spent: 45 minutes  signed by Pamela Moreno MD on 10/4/2020 at 9:49 AM    [facility]

## 2020-10-04 NOTE — PLAN OF CARE
Problem:  Activity:  Goal: Risk for activity intolerance will decrease  Description: Risk for activity intolerance will decrease  Outcome: Met This Shift

## 2020-10-05 VITALS
SYSTOLIC BLOOD PRESSURE: 167 MMHG | BODY MASS INDEX: 42.66 KG/M2 | HEART RATE: 70 BPM | DIASTOLIC BLOOD PRESSURE: 109 MMHG | WEIGHT: 315 LBS | TEMPERATURE: 98 F | HEIGHT: 72 IN | OXYGEN SATURATION: 98 % | RESPIRATION RATE: 16 BRPM

## 2020-10-05 LAB
ALBUMIN SERPL-MCNC: 3.8 G/DL (ref 3.5–5.2)
ALP BLD-CCNC: 81 U/L (ref 40–129)
ALT SERPL-CCNC: 14 U/L (ref 0–40)
ANION GAP SERPL CALCULATED.3IONS-SCNC: 10 MMOL/L (ref 7–16)
AST SERPL-CCNC: 21 U/L (ref 0–39)
BILIRUB SERPL-MCNC: 0.4 MG/DL (ref 0–1.2)
BILIRUBIN DIRECT: <0.2 MG/DL (ref 0–0.3)
BILIRUBIN, INDIRECT: NORMAL MG/DL (ref 0–1)
BUN BLDV-MCNC: 10 MG/DL (ref 6–20)
CALCIUM SERPL-MCNC: 9.4 MG/DL (ref 8.6–10.2)
CHLORIDE BLD-SCNC: 106 MMOL/L (ref 98–107)
CO2: 24 MMOL/L (ref 22–29)
CREAT SERPL-MCNC: 1 MG/DL (ref 0.7–1.2)
GFR AFRICAN AMERICAN: >60
GFR NON-AFRICAN AMERICAN: >60 ML/MIN/1.73
GLUCOSE BLD-MCNC: 98 MG/DL (ref 74–99)
POTASSIUM SERPL-SCNC: 4 MMOL/L (ref 3.5–5)
SODIUM BLD-SCNC: 140 MMOL/L (ref 132–146)
TOTAL PROTEIN: 6.8 G/DL (ref 6.4–8.3)

## 2020-10-05 PROCEDURE — 6370000000 HC RX 637 (ALT 250 FOR IP): Performed by: INTERNAL MEDICINE

## 2020-10-05 PROCEDURE — 2500000003 HC RX 250 WO HCPCS: Performed by: INTERNAL MEDICINE

## 2020-10-05 PROCEDURE — 2580000003 HC RX 258: Performed by: INTERNAL MEDICINE

## 2020-10-05 PROCEDURE — 6370000000 HC RX 637 (ALT 250 FOR IP): Performed by: STUDENT IN AN ORGANIZED HEALTH CARE EDUCATION/TRAINING PROGRAM

## 2020-10-05 PROCEDURE — 36415 COLL VENOUS BLD VENIPUNCTURE: CPT

## 2020-10-05 PROCEDURE — 99239 HOSP IP/OBS DSCHRG MGMT >30: CPT | Performed by: INTERNAL MEDICINE

## 2020-10-05 PROCEDURE — 80076 HEPATIC FUNCTION PANEL: CPT

## 2020-10-05 PROCEDURE — 80048 BASIC METABOLIC PNL TOTAL CA: CPT

## 2020-10-05 RX ORDER — HYDRALAZINE HYDROCHLORIDE 50 MG/1
50 TABLET, FILM COATED ORAL EVERY 8 HOURS SCHEDULED
Qty: 90 TABLET | Refills: 0 | Status: SHIPPED | OUTPATIENT
Start: 2020-10-05 | End: 2020-10-09 | Stop reason: SDUPTHER

## 2020-10-05 RX ORDER — LISINOPRIL 20 MG/1
20 TABLET ORAL ONCE
Status: COMPLETED | OUTPATIENT
Start: 2020-10-05 | End: 2020-10-05

## 2020-10-05 RX ORDER — METOPROLOL SUCCINATE 50 MG/1
50 TABLET, EXTENDED RELEASE ORAL 2 TIMES DAILY
Status: DISCONTINUED | OUTPATIENT
Start: 2020-10-05 | End: 2020-10-05 | Stop reason: HOSPADM

## 2020-10-05 RX ORDER — AMLODIPINE BESYLATE 2.5 MG/1
2.5 TABLET ORAL DAILY PRN
Qty: 30 TABLET | Refills: 0 | Status: SHIPPED | OUTPATIENT
Start: 2020-10-05

## 2020-10-05 RX ORDER — HYDRALAZINE HYDROCHLORIDE 25 MG/1
25 TABLET, FILM COATED ORAL ONCE
Status: COMPLETED | OUTPATIENT
Start: 2020-10-05 | End: 2020-10-05

## 2020-10-05 RX ORDER — METOPROLOL SUCCINATE 25 MG/1
25 TABLET, EXTENDED RELEASE ORAL ONCE
Status: COMPLETED | OUTPATIENT
Start: 2020-10-05 | End: 2020-10-05

## 2020-10-05 RX ORDER — HYDRALAZINE HYDROCHLORIDE 50 MG/1
50 TABLET, FILM COATED ORAL EVERY 8 HOURS SCHEDULED
Status: DISCONTINUED | OUTPATIENT
Start: 2020-10-05 | End: 2020-10-05 | Stop reason: HOSPADM

## 2020-10-05 RX ORDER — LISINOPRIL 20 MG/1
40 TABLET ORAL 2 TIMES DAILY
Status: DISCONTINUED | OUTPATIENT
Start: 2020-10-05 | End: 2020-10-05 | Stop reason: HOSPADM

## 2020-10-05 RX ORDER — SPIRONOLACTONE 25 MG/1
25 TABLET ORAL DAILY
Qty: 30 TABLET | Refills: 0 | Status: SHIPPED | OUTPATIENT
Start: 2020-10-06 | End: 2020-10-09 | Stop reason: SDUPTHER

## 2020-10-05 RX ORDER — LISINOPRIL 40 MG/1
40 TABLET ORAL DAILY
Qty: 30 TABLET | Refills: 0 | Status: SHIPPED | OUTPATIENT
Start: 2020-10-05 | End: 2020-10-09 | Stop reason: SDUPTHER

## 2020-10-05 RX ORDER — METOPROLOL SUCCINATE 50 MG/1
50 TABLET, EXTENDED RELEASE ORAL 2 TIMES DAILY
Qty: 30 TABLET | Refills: 0 | Status: SHIPPED | OUTPATIENT
Start: 2020-10-05 | End: 2020-10-09 | Stop reason: SDUPTHER

## 2020-10-05 RX ADMIN — METOPROLOL SUCCINATE 25 MG: 25 TABLET, EXTENDED RELEASE ORAL at 10:15

## 2020-10-05 RX ADMIN — HYDRALAZINE HYDROCHLORIDE 25 MG: 25 TABLET, FILM COATED ORAL at 10:16

## 2020-10-05 RX ADMIN — LABETALOL HYDROCHLORIDE 20 MG: 5 INJECTION INTRAVENOUS at 01:34

## 2020-10-05 RX ADMIN — HYDRALAZINE HYDROCHLORIDE 25 MG: 25 TABLET, FILM COATED ORAL at 06:22

## 2020-10-05 RX ADMIN — AMLODIPINE BESYLATE 2.5 MG: 2.5 TABLET ORAL at 08:14

## 2020-10-05 RX ADMIN — PANTOPRAZOLE SODIUM 40 MG: 40 TABLET, DELAYED RELEASE ORAL at 06:22

## 2020-10-05 RX ADMIN — SPIRONOLACTONE 25 MG: 25 TABLET ORAL at 08:14

## 2020-10-05 RX ADMIN — METOPROLOL SUCCINATE 25 MG: 25 TABLET, EXTENDED RELEASE ORAL at 08:14

## 2020-10-05 RX ADMIN — SODIUM CHLORIDE, PRESERVATIVE FREE 10 ML: 5 INJECTION INTRAVENOUS at 08:15

## 2020-10-05 RX ADMIN — LISINOPRIL 20 MG: 20 TABLET ORAL at 08:14

## 2020-10-05 RX ADMIN — LISINOPRIL 20 MG: 20 TABLET ORAL at 10:15

## 2020-10-05 NOTE — PLAN OF CARE
Problem: Activity:  Goal: Risk for activity intolerance will decrease  Description: Risk for activity intolerance will decrease  10/5/2020 0205 by Checo Valentine  Outcome: Met This Shift     Problem: Bowel/Gastric:  Goal: Bowel function will improve  Description: Bowel function will improve  Outcome: Met This Shift     Problem: Bowel/Gastric:  Goal: Occurrences of nausea will decrease  Description: Occurrences of nausea will decrease  Outcome: Met This Shift     Problem:  Bowel/Gastric:  Goal: Occurrences of vomiting will decrease  Description: Occurrences of vomiting will decrease  Outcome: Met This Shift     Problem: Pain:  Goal: Control of acute pain  Description: Control of acute pain  Outcome: Met This Shift     Problem: Pain:  Goal: Control of chronic pain  Description: Control of chronic pain  Outcome: Met This Shift

## 2020-10-05 NOTE — CARE COORDINATION
Discharge order noted. Patient is currently on a general diet. I met with the patient and his wife in room to explain role and discuss transition of care. He said that he has no needs for discharge and his wife will be transporting him home today.   Albertina Echols RN CM

## 2020-10-05 NOTE — DISCHARGE SUMMARY
Discharge Summary    Patient:  David Atkins  YOB: 1970    MRN: [id number]   Acct: [de-identified]    Primary Care Physician: No primary care provider on file. Admit date:  10/1/2020    Discharge date:   10/5/2020      Discharge Diagnoses:   Pancreatitis, unspecified pancreatitis type  Principal Problem:    Pancreatitis, unspecified pancreatitis type  Active Problems:    Metabolic pancreatitis    Poorly-controlled hypertension    Uncontrolled hypertension    Morbid obesity with BMI of 50.0-59.9, adult ([address]    Non-alcoholic fatty liver disease  Resolved Problems:    * No resolved hospital problems. *        Admitted for: (HPI) see admission H&P    Hospital Course: Summary: Mr. Pollack is a 48 y. o. male presented to the [facility] ED for mid upper abdominal pain [no lower abdominal pain], beginning 2 weeks ago associated with decreased appetite for the past week and nausea but no vomiting, diarrhea, black or tarry stools. Patient was told in the past that he had \"gallbladder issues\" 2-year ago. Patient was admitted with acute pancreatitis likely due to metabolic pancreatitis from obesity. He was advised to lose 100 225 pounds to minimize recurrence of pancreatitis scarring and pancreatic insufficiency as well as progression of fatty liver to cirrhosis. Patient was found to have severe uncontrolled hypertension. Doxazosin was discontinued and replaced with current regiment at discharge. Doses were adjusted upward on the morning of discharge because blood pressure remained slightly elevated: 159-160 2/93- 95 which was significantly improved from previous blood pressure (see below). - Patient's PCP retired/left and he will be seeing a new PCP on Friday, 10/10 9/2020. He will need basic metabolic check as well as blood pressure check and medication dose adjustment if indicated.     Diagnosis and management:   . # AcPancr/MetPancr/MO: Likely pancreatitis due to obesity.  CT acute uncomplicated visualized and is unremarkable. No evidence of acute appendicitis. Pelvis: Bladder and prostate demonstrate no acute abnormality. Peritoneum/Retroperitoneum: No ascites or free air. Aorta demonstrates normal caliber. No focal drainable fluid collection. Bones/Soft Tissues:  No acute abnormality of the visualized osseous structures. Mild lower lumbar spine disc degenerative changes. Findings consistent with acute uncomplicated pancreatitis. Severe hepatic steatosis. Us Gallbladder Ruq    Result Date: 10/1/2020  EXAMINATION: RIGHT UPPER QUADRANT ULTRASOUND 10/1/2020 9:38 pm COMPARISON: Correlation is made with a prior CT of the abdomen and pelvis from May 2016 HISTORY: [address] [facility] [address]: abdominal pain TECHNOLOGIST PROVIDED HISTORY: Reason for exam:->abdominal pain What reading provider will be dictating this exam?->CRC FINDINGS: Evaluation is limited by the patient's large body habitus. No evidence of gallstones. No gallbladder wall thickening or pericholecystic fluid. The common bile duct appears dilated measuring approximately 9 mm. Pancreas not imaged on this examination. No evidence of cholelithiasis or signs of cholecystitis. The common bile duct is dilated measuring approximately 9 mm. Distal obstructive process cannot be excluded. RECOMMENDATIONS: Consider further evaluation with CT of the abdomen and pelvis.         Results for orders placed or performed during the hospital encounter of 10/01/20   CBC auto differential   Result Value Ref Range    WBC 10.2 4.5 - 11.5 E9/L    RBC 4.61 3.80 - 5.80 E12/L    Hemoglobin 13.8 12.5 - 16.5 g/dL    Hematocrit 40.9 37.0 - 54.0 %    MCV 88.7 80.0 - 99.9 fL    MCH 29.9 26.0 - 35.0 pg    MCHC 33.7 32.0 - 34.5 %    RDW 14.1 11.5 - 15.0 fL    Platelets 081 218 - 811 E9/L    MPV 11.2 7.0 - 12.0 fL    Neutrophils % 53.3 43.0 - 80.0 %    Immature Granulocytes % 0.2 0.0 - 5.0 %    Lymphocytes % 37.1 20.0 - 42.0 %    Monocytes % 8.1 2.0 - 12.0 % Eosinophils % 0.9 0.0 - 6.0 %    Basophils % 0.4 0.0 - 2.0 %    Neutrophils Absolute 5.41 1.80 - 7.30 E9/L    Immature Granulocytes # 0.02 E9/L    Lymphocytes Absolute 3.77 1.50 - 4.00 E9/L    Monocytes Absolute 0.82 0.10 - 0.95 E9/L    Eosinophils Absolute 0.09 0.05 - 0.50 E9/L    Basophils Absolute 0.04 0.00 - 0.20 E9/L   Lipase   Result Value Ref Range    Lipase 292 (H) 13 - 60 U/L   Lactic Acid, Plasma   Result Value Ref Range    Lactic Acid 0.8 0.5 - 2.2 mmol/L   Urinalysis   Result Value Ref Range    Color, UA Yellow Straw/Yellow    Clarity, UA Clear Clear    Glucose, Ur Negative Negative mg/dL    Bilirubin Urine SMALL (A) Negative    Ketones, Urine 15 (A) Negative mg/dL    Specific Gravity, UA 1.025 1.005 - 1.030    Blood, Urine SMALL (A) Negative    pH, UA 5.5 5.0 - 9.0    Protein, UA Negative Negative mg/dL    Urobilinogen, Urine 0.2 <2.0 E.U./dL    Nitrite, Urine Negative Negative    Leukocyte Esterase, Urine Negative Negative   Troponin   Result Value Ref Range    Troponin <0.01 0.00 - 0.03 ng/mL   Comprehensive Metabolic Panel   Result Value Ref Range    Sodium 135 132 - 146 mmol/L    Potassium 4.2 3.5 - 5.0 mmol/L    Chloride 99 98 - 107 mmol/L    CO2 22 22 - 29 mmol/L    Anion Gap 14 7 - 16 mmol/L    Glucose 88 74 - 99 mg/dL    BUN 10 6 - 20 mg/dL    CREATININE 1.0 0.7 - 1.2 mg/dL    GFR Non-African American >60 >=60 mL/min/1.73    GFR African American >60     Calcium 9.0 8.6 - 10.2 mg/dL    Total Protein 7.2 6.4 - 8.3 g/dL    Alb 3.9 3.5 - 5.2 g/dL    Total Bilirubin 0.6 0.0 - 1.2 mg/dL    Alkaline Phosphatase 88 40 - 129 U/L    ALT 14 0 - 40 U/L    AST 27 0 - 39 U/L   SPECIMEN REJECTION   Result Value Ref Range    Rejected Test cmp, trop     Reason for Rejection see below    Microscopic Urinalysis   Result Value Ref Range    WBC, UA NONE 0 - 5 /HPF    RBC, UA 1-3 0 - 2 /HPF    Bacteria, UA NONE SEEN None Seen /HPF   Basic Metabolic Panel w/ Reflex to MG   Result Value Ref Range    Sodium 135 132 - 146 mmol/L    Potassium reflex Magnesium 4.4 3.5 - 5.0 mmol/L    Chloride 100 98 - 107 mmol/L    CO2 22 22 - 29 mmol/L    Anion Gap 13 7 - 16 mmol/L    Glucose 74 74 - 99 mg/dL    BUN 9 6 - 20 mg/dL    CREATININE 0.9 0.7 - 1.2 mg/dL    GFR Non-African American >60 >=60 mL/min/1.73    GFR African American >60     Calcium 9.2 8.6 - 10.2 mg/dL   Hepatic function panel   Result Value Ref Range    Total Protein 7.1 6.4 - 8.3 g/dL    Alb 3.8 3.5 - 5.2 g/dL    Alkaline Phosphatase 84 40 - 129 U/L    ALT 16 0 - 40 U/L    AST 27 0 - 39 U/L    Total Bilirubin 0.6 0.0 - 1.2 mg/dL    Bilirubin, Direct <0.2 0.0 - 0.3 mg/dL    Bilirubin, Indirect see below 0.0 - 1.0 mg/dL   CBC auto differential   Result Value Ref Range    WBC 7.6 4.5 - 11.5 E9/L    RBC 4.52 3.80 - 5.80 E12/L    Hemoglobin 13.3 12.5 - 16.5 g/dL    Hematocrit 40.7 37.0 - 54.0 %    MCV 90.0 80.0 - 99.9 fL    MCH 29.4 26.0 - 35.0 pg    MCHC 32.7 32.0 - 34.5 %    RDW 13.9 11.5 - 15.0 fL    Platelets 012 955 - 574 E9/L    MPV 11.0 7.0 - 12.0 fL    Neutrophils % 51.1 43.0 - 80.0 %    Immature Granulocytes % 0.1 0.0 - 5.0 %    Lymphocytes % 38.0 20.0 - 42.0 %    Monocytes % 8.7 2.0 - 12.0 %    Eosinophils % 1.6 0.0 - 6.0 %    Basophils % 0.5 0.0 - 2.0 %    Neutrophils Absolute 3.88 1.80 - 7.30 E9/L    Immature Granulocytes # 0.01 E9/L    Lymphocytes Absolute 2.89 1.50 - 4.00 E9/L    Monocytes Absolute 0.66 0.10 - 0.95 E9/L    Eosinophils Absolute 0.12 0.05 - 0.50 E9/L    Basophils Absolute 0.04 0.00 - 0.20 E9/L   Lipid panel   Result Value Ref Range    Cholesterol, Total 166 0 - 199 mg/dL    Triglycerides 96 0 - 149 mg/dL    HDL 32 >40 mg/dL    LDL Calculated 115 (H) 0 - 99 mg/dL    VLDL Cholesterol Calculated 19 mg/dL   Lipase   Result Value Ref Range    Lipase 117 (H) 13 - 60 U/L   IgG, IgA, IgM   Result Value Ref Range    IgA 301 70 - 400 mg/dL    IgG 1243 700 - 1600 mg/dL    IgM 80 40 - 230 mg/dL   Hepatic function panel   Result Value Ref Range    Total Protein 7.0 6.4 - 8.3 g/dL    Alb 3.8 3.5 - 5.2 g/dL    Alkaline Phosphatase 83 40 - 129 U/L    ALT 15 0 - 40 U/L    AST 24 0 - 39 U/L    Total Bilirubin 0.6 0.0 - 1.2 mg/dL    Bilirubin, Direct <0.2 0.0 - 0.3 mg/dL    Bilirubin, Indirect see below 0.0 - 1.0 mg/dL   Basic Metabolic Panel   Result Value Ref Range    Sodium 139 132 - 146 mmol/L    Potassium 4.1 3.5 - 5.0 mmol/L    Chloride 106 98 - 107 mmol/L    CO2 23 22 - 29 mmol/L    Anion Gap 10 7 - 16 mmol/L    Glucose 102 (H) 74 - 99 mg/dL    BUN 8 6 - 20 mg/dL    CREATININE 1.0 0.7 - 1.2 mg/dL    GFR Non-African American >60 >=60 mL/min/1.73    GFR African American >60     Calcium 9.3 8.6 - 10.2 mg/dL   Lipase   Result Value Ref Range    Lipase 72 (H) 13 - 60 U/L   Hepatic function panel   Result Value Ref Range    Total Protein 6.8 6.4 - 8.3 g/dL    Alb 3.8 3.5 - 5.2 g/dL    Alkaline Phosphatase 81 40 - 129 U/L    ALT 14 0 - 40 U/L    AST 21 0 - 39 U/L    Total Bilirubin 0.4 0.0 - 1.2 mg/dL    Bilirubin, Direct <0.2 0.0 - 0.3 mg/dL    Bilirubin, Indirect see below 0.0 - 1.0 mg/dL   Basic Metabolic Panel   Result Value Ref Range    Sodium 140 132 - 146 mmol/L    Potassium 4.0 3.5 - 5.0 mmol/L    Chloride 106 98 - 107 mmol/L    CO2 24 22 - 29 mmol/L    Anion Gap 10 7 - 16 mmol/L    Glucose 98 74 - 99 mg/dL    BUN 10 6 - 20 mg/dL    CREATININE 1.0 0.7 - 1.2 mg/dL    GFR Non-African American >60 >=60 mL/min/1.73    GFR African American >60     Calcium 9.4 8.6 - 10.2 mg/dL   EKG 12 Lead   Result Value Ref Range    Ventricular Rate 74 BPM    Atrial Rate 74 BPM    P-R Interval 170 ms    QRS Duration 104 ms    Q-T Interval 402 ms    QTc Calculation (Bazett) 446 ms    P Axis 35 degrees    R Axis 8 degrees    T Axis 35 degrees       Diet:  DIET GENERAL; No Added Salt (3-4 GM);  Low Fat, Gluten Free    Activity:  Activity as tolerated (Patient may move about with assist as indicated or with supervision.)    Follow-up:  in 10/9/2020 days with new PCP with basic metabolic panel check and blood pressure check and medication adjustment if indicated    Disposition: home    Condition: Stable      Time Spent: 45 minutes    Electronically signed by Babita Freire MD on 10/5/2020 at 11:48 AM    Discharging Hospitalist

## 2020-10-07 NOTE — ADT AUTH CERT
Utilization Reviews         Pancreatitis - Care Day 4 (10/5/2020) by Sina Masterson RN         Review Status  Review Entered    Completed  10/7/2020 08:47        Criteria Review       Care Day: 4 Care Date: 10/5/2020 Level of Care: Inpatient Floor    Guideline Day 3    Clinical Status    (X) * Hemodynamic stability    10/7/2020 8:47 AM EDT by Vamshi Finley      98 16 60 159/93    (X) * No evidence of infection requiring inpatient care    (X) * Amylase level normal or improved    (X) * Pain absent or managed    (X) * Diet tolerated    (X) * Renal function at baseline or acceptable for next level of care    (X) * Electrolyte abnormalities absent or acceptable for next level of care    (X) * Discharge plans and education understood    Activity    (X) * Ambulatory or acceptable for next level of care [I]    Routes    (X) * Oral hydration, medications, and diet    10/7/2020 8:46 AM EDT by Vamshi Finley      cont current diet, oral intake and meds plus  apresoline 25 mg x1 po  apresoline increased to 50 mg tid po  lisinopril 20 mg x1 po  lisinopril increased to 40 mg bid po  toprol 25 mg x1 po  toprol increased to 50 mg bid po   see notes for additional meds    Interventions    (X) Laboratory tests    * Milestone    Additional Notes    10/5/2020       Vs see above       Meds cont    Norvasc 2.5 mg qd prn x1 po       ___________________________________________________________       Per IM       Discharge note       Admit date:  10/1/2020                          Discharge date:   10/5/2020              Discharge Diagnoses:    Pancreatitis, unspecified pancreatitis type    Principal Problem:      Pancreatitis, unspecified pancreatitis type    Active Problems:      Metabolic pancreatitis      Poorly-controlled hypertension      Uncontrolled hypertension      Morbid obesity with BMI of 50.0-59.9, adult ([address]      Non-alcoholic fatty liver disease    Resolved Problems:      * No resolved hospital problems.  *                Admitted for: (HPI) see admission H&P         Hospital Course: Summary: Mr. Pollack is a 48 y. o. male presented to the [facility] ED for mid upper abdominal pain [no lower abdominal pain], beginning 2 weeks ago associated with decreased appetite for the past week and nausea but no vomiting, diarrhea, black or tarry stools. Patient was told in the past that he had \"gallbladder issues\" 2-year ago.         Patient was admitted with acute pancreatitis likely due to metabolic pancreatitis from obesity. [facility] was advised to lose 100 225 pounds to minimize recurrence of pancreatitis scarring and pancreatic insufficiency as well as progression of fatty liver to cirrhosis.  Patient was found to have severe uncontrolled hypertension.  Doxazosin was discontinued and replaced with current regiment at discharge.  Doses were adjusted upward on the morning of discharge because blood pressure remained slightly elevated: 159-160 2/93- 95 which was significantly improved from previous blood pressure (see below).      - Patient's PCP retired/left and he will be seeing a new PCP on Friday, 10/10 9/2020. [facility] will need basic metabolic check as well as blood pressure check and medication dose adjustment if indicated.         Diagnosis and management:    . # AcPancr/MetPancr/MO: Likely pancreatitis due to obesity. CT acute uncomplicated pancreatitis. Severe hepatic steatosis. RUQ US: No cholelithiasis normal gallbladder normal biliary tree and common bile duct size. - [10/2]    - Bowel rest, n.p.o. except for sips of water and medications. Ringer's lactate at 125 cc an hour; nonopiate pain control; lipase    - Absolute need weight reduction with metabolic acute pancreatitis and severe hepatic steatosis    - [10/3-10/4]    No pain noted since [10/3]. Advanced liquid to GI soft [10/3], low fat;Ringer's lactate at 125 cc/h-> stopped [10/3]. Lipase 117-> 72    . # uHTN:    - [10/2]    /100. On doxazosin 1mg/hs.  Started hydralazine 25 mg IgM: 80       Iv labetalol 20 mg q4 prn x1    _________________________________________________________       Per GS       Tolerating diet    Pain improved    __________________________________________________________       Per IM       Summary: Mr. Pollack is a 48 y. o. male presented to the [facility] ED for mid upper abdominal pain [no lower abdominal pain], beginning 2 weeks ago associated with decreased appetite for the past week and nausea but no vomiting, diarrhea, black or tarry stools. Patient was told in the past that he had \"gallbladder issues\" 2-year ago.         Diagnosis and management:         .# AcPancr/MetPancr/MO: Likely pancreatitis due to obesity. CT acute uncomplicated pancreatitis. Severe hepatic steatosis. RUQ US: No cholelithiasis normal gallbladder normal biliary tree and common bile duct size. - [10/2]    - Bowel rest, n.p.o. except for sips of water and medications. Ringer's lactate at 125 cc an hour; nonopiate pain control; lipase    - Absolute need weight reduction with metabolic acute pancreatitis and severe hepatic steatosis    - [10/3-10/4]    No pain noted since [10/3]. Advanced liquid to GI soft [10/3], low fat;Ringer's lactate at 125 cc/h-> stopped [10/3]. Lipase 117-> 72    . # uHTN:    - [10/2]    /100. On doxazosin 1mg/hs. Started hydralazine 25 mg p.o.x1; hydralazine 10 mg IV as needed for sbp > 180, dbp greater than 100.    - [10/3]    -187/, add lisinopril 10 mg bid, metoprolol 25 mg bid; may stop tomorrow Doxazosin. Renal function normal.    - [10/4]    -173/80-90, inc lisinopril 20 mg bid, metoprolol 25 mg bid; add hydralazine 25mg tid and aldactone 25mg/d, stop Doxazosin/Cardura.  Renal function normal: cr/gfr 0.9/>60.                 Pancreatitis - Care Day 2 (10/3/2020) by Marry Arce RN         Review Status  Review Entered    Completed  10/4/2020 10:22        Criteria Review       Care Day: 2 Care Date: 10/3/2020 Level of Care: Inpatient Floor Guideline Day 2    Level Of Care    (X) ICU [H] or floor    10/4/2020 10:22 AM EDT by Scarlett Osborn      MEDICAL SURGICAL UNIT    Clinical Status    (X) * Hemodynamic stability    10/4/2020 10:22 AM EDT by Scarlett Osborn      VS:  T. 36.9, b/p 164/92 (repeat 173/96), HR 85, R 18, SpO2 97% RA    (X) * Pain absent or reduced    10/4/2020 10:22 AM EDT by Scarlett Osborn      Acetaminophen 650mg po every 6 hours prn - received x 1    Activity    (X) Activity as tolerated    10/4/2020 10:22 AM EDT by Scarlett Osborn      Up as tolerated    Routes    (X) Oral diet as tolerated    10/4/2020 10:22 AM EDT by Scarlett Osborn      NPO revised to Clear liquid diet and later to General Diet    Interventions    (X) * NG tube absent    * Milestone    Additional Notes    10/3/2020  Care Day 2          Labs:    Lipase 117       Additional Orders:    Lovenox 40mg SQ daily    Pantoprazole 40mg IV daily    Lactated Ringers@ 100ml/h    Labetalol 20mg IV every 4 hours prn - received x 1    Consult General Surgery    VS routine          Internal Medicine Note    Subjective:      Patient is having problems with abdominal pain       Assessment/Plan:    Principal Problem:      Pancreatitis, unspecified pancreatitis type    Active Problems:      Metabolic pancreatitis      Poorly-controlled hypertension      Uncontrolled hypertension      Morbid obesity with BMI of 50.0-59.9, adult ([address]      Non-alcoholic fatty liver disease    Resolved Problems:      * No resolved hospital problems. *         Summary: Mr. Pollack is a 48 y. o. male presented to the [facility] ED for mid upper abdominal pain [no lower abdominal pain], beginning 2 weeks ago associated with decreased appetite for the past week and nausea but no vomiting, diarrhea, black or tarry stools. Patient was told in the past that he had \"gallbladder issues\" 2-year ago.         Diagnosis and management:         .# AcPancr/MetPancr: Likely pancreatitis due to obesity. CT acute uncomplicated pancreatitis. Severe hepatic steatosis. RUQ US: No cholelithiasis normal gallbladder normal biliary tree and common bile duct size. - [10/2]    - Bowel rest, n.p.o. except for sips of water and medications. Ringer's lactate at 125 cc an hour; nonopiate pain control; lipase    - [10/3]    No pain noted. Advanced as tolerate to GI soft, low fat;Ringer's lactate at 125 cc/h-> stopped. Lipase    . # MO: Absolute need weight reduction with metabolic acute pancreatitis and severe hepatic steatosis    . # uHTN:    - [10/2]    /100. On doxazosin 1mg/hs. Started hydralazine 25 mg p.o.x1; hydralazine 10 mg IV as needed for sbp > 180, dbp greater than 100.    - [10/3]    -187/, add lisinopril 10 mg bid, metoprolol 25 mg bid; may stop tomorrow Doxazosin. Renal function normal: cr/gfr 0.9/>60.                Physician Advisor Inpatient Recommendation Letter by Sammy Mejias RN         Review Status  Review Entered    In Primary  10/3/2020 09:46        Criteria Review    obs list upgrade  We recommend that the following pt's current hospitalization under OBSERVATION   status is upgraded to INPATIENT; if you agree, please place a new ADMIT order  in [facility] as recommended. .       Name: Dank Basilio   : 1970   CSN: [id number]   INSURANCE: [employer]        Clinical summary Acute pancreatitis-elevated CT abd shows: There is extensive  inflammatory changes involving the head and body of the pancreas suggesting  acute pancreatitis.      MCG criteria applies yes,   Comments Meets criteria. Recommend upgrade to inpt. This chart was reviewed at 8:29 AM 10/3/2020    [address] [place] [address]   CELL :   ________________________________________________________________________________  _______    Commercial & Medicare Advantage Plan : The final decision of the patient's  hospitalization status depends on the attending physician's judgment.        The information in this document is a recommendation to be used for utilization  review and utilization management purposes only. This recommendation is not an  order. The recommendation is made based on the information reviewed at the time  of the referral, is pursuant to the [facility] Conditions of  Participation (42 CFR Part 482), and is neither a judgment nor an assessment  with regard to the appropriateness or quality of clinical care. Nothing in this  document may be used to limit, alter, or affect clinical services provided to  the patient named below. The provider of services is ultimately responsible for  the submission of a claim that has met all requirements for correct coding,  billing, and reimbursement.    Additional Notes    10/3/2020  Order changed to Inpatient

## 2020-10-09 ENCOUNTER — OFFICE VISIT (OUTPATIENT)
Dept: INTERNAL MEDICINE | Age: 50
End: 2020-10-09
Payer: COMMERCIAL

## 2020-10-09 VITALS
TEMPERATURE: 95.7 F | WEIGHT: 315 LBS | RESPIRATION RATE: 20 BRPM | OXYGEN SATURATION: 100 % | HEIGHT: 72 IN | SYSTOLIC BLOOD PRESSURE: 140 MMHG | HEART RATE: 72 BPM | BODY MASS INDEX: 42.66 KG/M2 | DIASTOLIC BLOOD PRESSURE: 92 MMHG

## 2020-10-09 PROBLEM — H54.40 BLINDNESS OF RIGHT EYE WITH NORMAL VISION IN CONTRALATERAL EYE: Status: ACTIVE | Noted: 2020-10-09

## 2020-10-09 LAB — HBA1C MFR BLD: 5.7 %

## 2020-10-09 PROCEDURE — 99203 OFFICE O/P NEW LOW 30 MIN: CPT | Performed by: INTERNAL MEDICINE

## 2020-10-09 PROCEDURE — 99202 OFFICE O/P NEW SF 15 MIN: CPT | Performed by: INTERNAL MEDICINE

## 2020-10-09 PROCEDURE — 83036 HEMOGLOBIN GLYCOSYLATED A1C: CPT | Performed by: INTERNAL MEDICINE

## 2020-10-09 RX ORDER — METOPROLOL SUCCINATE 50 MG/1
50 TABLET, EXTENDED RELEASE ORAL 2 TIMES DAILY
Qty: 30 TABLET | Refills: 0 | Status: SHIPPED | OUTPATIENT
Start: 2020-10-09

## 2020-10-09 RX ORDER — AMLODIPINE BESYLATE 2.5 MG/1
2.5 TABLET ORAL DAILY
Qty: 30 TABLET | Refills: 2 | Status: SHIPPED | OUTPATIENT
Start: 2020-10-09

## 2020-10-09 RX ORDER — LISINOPRIL 40 MG/1
40 TABLET ORAL DAILY
Qty: 30 TABLET | Refills: 0 | Status: SHIPPED | OUTPATIENT
Start: 2020-10-09

## 2020-10-09 RX ORDER — SPIRONOLACTONE 25 MG/1
25 TABLET ORAL DAILY
Qty: 30 TABLET | Refills: 0 | Status: SHIPPED | OUTPATIENT
Start: 2020-10-09

## 2020-10-09 RX ORDER — AMLODIPINE BESYLATE 2.5 MG/1
2.5 TABLET ORAL DAILY PRN
Qty: 30 TABLET | Refills: 0 | Status: CANCELLED | OUTPATIENT
Start: 2020-10-09

## 2020-10-09 RX ORDER — BLOOD PRESSURE TEST KIT
1 KIT MISCELLANEOUS DAILY
Qty: 1 KIT | Refills: 0 | Status: SHIPPED | OUTPATIENT
Start: 2020-10-09

## 2020-10-09 RX ORDER — HYDRALAZINE HYDROCHLORIDE 50 MG/1
50 TABLET, FILM COATED ORAL EVERY 8 HOURS SCHEDULED
Qty: 90 TABLET | Refills: 0 | Status: SHIPPED | OUTPATIENT
Start: 2020-10-09

## 2020-10-09 SDOH — ECONOMIC STABILITY: TRANSPORTATION INSECURITY
IN THE PAST 12 MONTHS, HAS LACK OF TRANSPORTATION KEPT YOU FROM MEETINGS, WORK, OR FROM GETTING THINGS NEEDED FOR DAILY LIVING?: NO

## 2020-10-09 SDOH — ECONOMIC STABILITY: FOOD INSECURITY: WITHIN THE PAST 12 MONTHS, YOU WORRIED THAT YOUR FOOD WOULD RUN OUT BEFORE YOU GOT MONEY TO BUY MORE.: NEVER TRUE

## 2020-10-09 SDOH — ECONOMIC STABILITY: FOOD INSECURITY: WITHIN THE PAST 12 MONTHS, THE FOOD YOU BOUGHT JUST DIDN'T LAST AND YOU DIDN'T HAVE MONEY TO GET MORE.: NEVER TRUE

## 2020-10-09 SDOH — ECONOMIC STABILITY: TRANSPORTATION INSECURITY
IN THE PAST 12 MONTHS, HAS THE LACK OF TRANSPORTATION KEPT YOU FROM MEDICAL APPOINTMENTS OR FROM GETTING MEDICATIONS?: NO

## 2020-10-09 SDOH — ECONOMIC STABILITY: INCOME INSECURITY: HOW HARD IS IT FOR YOU TO PAY FOR THE VERY BASICS LIKE FOOD, HOUSING, MEDICAL CARE, AND HEATING?: SOMEWHAT HARD

## 2020-10-09 ASSESSMENT — PATIENT HEALTH QUESTIONNAIRE - PHQ9
2. FEELING DOWN, DEPRESSED OR HOPELESS: 0
SUM OF ALL RESPONSES TO PHQ QUESTIONS 1-9: 0
SUM OF ALL RESPONSES TO PHQ QUESTIONS 1-9: 0
1. LITTLE INTEREST OR PLEASURE IN DOING THINGS: 0
SUM OF ALL RESPONSES TO PHQ9 QUESTIONS 1 & 2: 0

## 2020-10-09 NOTE — PROGRESS NOTES
Rowena Thompson [facility]  Internal Medicine Residency Program  [facility] Note      SUBJECTIVE:  CC: had concerns including New Patient (was recently in hospital for Pancreatitis ). Srinivas Griffiths presented to the [facility] for a routine visit    New patient. Last PCP visit 2-3 years ago, but lost to follow-up. NKDA  PMH: Class 3 obesity, Severe hepatic steatosis. FamHx: Cardiac and diabetes in his mother. 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 Meds: Takes ibuprofen occasionally. Started new meds from hospital, compliant. Social Hx: Works as a . Smoking history (32 pack years), occasional EtOH use, no drug use. Patient experienced worsening epigastric abdominal pain/cramping and constipation for 2 weeks with associated decrease appetite for 1.5 weeks (only drinking water and Gatorade) and no BM for 2 weeks (tried Miralax, with little benefit). Jessica Sutherland on 10/1/2020 and diagnosed with acute uncomplicated pancreatitis, severe hepatic steatosis (CT abd/pelvis), and severe hypertension (162/105 on admission). No n/v, black/tarry stools. Today, the patient feels better and the abdominal pain completely resolved. Denies N/V, constipation. Last bowel movement this morning. Hypertension- uncontrolled  ED discharge /90's  BP today 139/92, repeat 140/92  Needs BP cuff   On lisinopril 40 mg oral daily   On metoprolol sucinate 50 mg BID  On Spironolactone 25 mg OD  On hydralazine 50 mg Q8 hrs   Norvasc 2.5 mg prn and switching to daily. Denies HA, visual changes, CP, SOB, swelling in LE. Has SINGER going up steps. Non-alcoholic fatty liver disease  Found on imaging at the hospital (CT abdomen/pelvis 10/2/2020)    Class 3 Obesity  BMI 61  Needs aggressive diet and lifestyle modifications. Patient starting diet changes consisting of fruits, vegetables, and low salt diet.    Discussed about dietary/weight loss referral to  [facility]. Patient would like to try on his own first before getting a referral. He will start going back to the gym next week. Tobacco Abuse  Smoking history (32 pack years)  Discussed smoking cessation and patient interested and willing to try before seeking assistance with nicotine patch/gum. Review Of Systems:  General: no fevers, chills, weight loss or gain. Ears/Nose/Throat: no hearing loss, tinnitus, vertigo, nosebleed, nasal congestion, rhinorrhea, sore throat  Respiratory: no cough, pleuritic chest pain, dyspnea, or wheezing  Cardiovascular: no chest pain, angina, + dyspnea on exertion, orthopnea, PND, palpitations, or claudication  Gastrointestinal: no nausea, vomiting, heartburn, diarrhea, constipation, abdominal pain, hematochezia or melena  Genitourinary: no urinary urgency, frequency, dysuria, nocturia, hesitancy, or incontinence  Musculoskeletal: no arthritis, arthralgia, myalgia, weakness, or morning stiffness  Skin: no abnormal pigmentation, rash, itching, masses, hair or nail changes    Current Outpatient Medications on File Prior to Visit   Medication Sig Dispense Refill    amLODIPine (NORVASC) 2.5 MG tablet Take 1 tablet by mouth daily as needed (SBP>160) Additional prescriptions by PCP 30 tablet 0    diclofenac (VOLTAREN) 75 MG EC tablet Take 1 tablet by mouth 2 times daily 20 tablet 0    famotidine (PEPCID) 20 MG tablet Take 1 tablet by mouth 2 times daily for 7 days 14 tablet 0     No current facility-administered medications on file prior to visit. OBJECTIVE:    VS: BP (!) 140/92   Pulse 72   Temp 95.7 °F (35.4 °C) (Temporal)   Resp 20   Ht 6' (1.829 m)   Wt (!) 453 lb 6.4 oz (205.7 kg)   SpO2 100% Comment: on room air  BMI 61.49 kg/m²   General appearance: Alert, Awake, Oriented times 3, no distress, comfortable, severe obesity. Lungs: Lungs clear to auscultation bilaterally. No rhonchi, crackles or wheezes. Heart: S1 S2  Regular rate and rhythm.  No rub, murmur or gallop  Abdomen: Protuberant abdomen, soft, non-tender. BS normal. No masses, organomegaly, no guarding rebound or rigidity. Extremities: Non-pitting edema, Peripheral pulses palpable 2/4. ASSESSMENT/PLAN:  Patel Kunz was seen today for new patient. Diagnoses and all orders for this visit:    Screening for diabetes mellitus  - POCT glycosylated hemoglobin (Hb A1C) today 5.3%  - Family history of diabetes; BS  in the hospital     Uncontrolled hypertension  - BP improved since hospital discharge, today   Pancreatitis, unspecified pancreatitis type    Non-alcoholic fatty liver disease    Morbid obesity with BMI of 50.0-59.9, adult ([address]    Blindness of right eye with normal vision in contralateral eye    Other orders  -     hydrALAZINE (APRESOLINE) 50 MG tablet; Take 1 tablet by mouth every 8 hours Additional prescriptions by PCP  -     lisinopril (PRINIVIL;ZESTRIL) 40 MG tablet; Take 1 tablet by mouth daily Additional prescriptions by PCP  -     metoprolol succinate (TOPROL XL) 50 MG extended release tablet; Take 1 tablet by mouth 2 times daily Additional prescriptions by PCP  -     spironolactone (ALDACTONE) 25 MG tablet; Take 1 tablet by mouth daily Additional prescriptions by PCP  -     amLODIPine (NORVASC) 2.5 MG tablet; Take 1 tablet by mouth daily        I have reviewed all pertient PMHx, PSHx, FamHx, Social Hx, medications, and allergies and updated history as appropriate.     RTC:    I have reviewed my findings and recommendations with Jay De La Cruz MD PGY-1   10/9/2020 3:48 PM

## 2020-10-09 NOTE — PATIENT INSTRUCTIONS
Dear Elisabeth Ortega,        Thank you for coming to your appointment today. I hope we have addressed all of your needs. Please make sure to do the following:  - Continue your medications as listed. - Take Norvasc 2.5 mg daily. - We will see each other again in 2 months. Have a great day!         Sincerely,  Rosita Devine M.D  10/9/2020  3:05 PM

## 2020-10-09 NOTE — PROGRESS NOTES
Patient verbalized understanding of office instructions. He will call with questions or concerns. Pt was given discharge instructions, all questions were fully answered.       Offered Flu vaccine at present, pt declined

## 2020-10-09 NOTE — PROGRESS NOTES
Rowena Thompson [facility]  Internal Medicine Clinic    Attending Physician Statement  I have discussed the case, including pertinent history and exam findings with the resident. I have seen and examined the patient and the key elements of the encounter have been performed by me. I agree with the assessment, plan and orders as documented by the resident. I have reviewed all pertinent PMHx, PSHx, FamHx, SocialHx, medications, and allergies and updated history as appropriate. Patient here as a new patient to our office to establish care post-hospital discharge after admission for acute pancreatitis, constipation. He infrequently visits doctors -- skeptical of care previously-- known hypertensive  For years but not on any medication treatment prior to October admission. PMH of  severe obesity, NAFLD (severe steatosis on CT)      Surgical hx:Traumatic eye injury on the right leading to blindness after surgeries in childhood. Famiy hx: DM mother    Social history: 30 pack yr tobacco smoker, works as a      Post-discharge -- he is following dietary changes as recommended in hospital including caloric reduction (BMI 61). Pertinent exam findings:  Appears stated age, -American male, no apparent distress. Pleasant conversation. Heart regular rate and rhythm, distant sounds secondary to body habitus. Lungs clear to auscultation bilaterally. ,  No wheeze or rhonchi. Abdomen obese, nontender to palpation without any guarding. Lower extremities with non-pitting edema.     Hypertension, borderline control  -Start amlodipine 2.5 mg daily (instead of as needed)  -Continue hydralazine 50 mg every 8 hrs, lisinopril 40 mg daily, metoprolol 50 mg twice daily Aldactone 25 mg daily  -Consideration for sleep study but at this point he denies any witnessed apneic episodes, daytime drowsiness    Obesity class 3 with BMI 61.4  - advised continued diet/lifestyle modifications --he appears

## 2020-10-12 NOTE — PROGRESS NOTES
murmur or gallop  Abdomen: Protuberant abdomen, soft, non-tender. BS normal. No masses, organomegaly, no guarding rebound or rigidity. Extremities: Non-pitting edema in LE, Peripheral pulses palpable 2/4. ASSESSMENT/PLAN:  Nickie Thomas was seen today for new patient. Diagnoses and all orders for this visit:    Screening for diabetes mellitus  - POCT glycosylated hemoglobin (Hb A1C) today 5.3%  - Family history of diabetes; BS  in the hospital     Uncontrolled hypertension        - BP improved since hospital discharge, but still needs improvement. Switching amlodipine from prn to daily dosing.         - BP kit ordered. Record BP at home in a log.   - Continue hydrALAZINE (APRESOLINE) 50 MG tablet; Take 1 tablet by mouth every 8 hours Additional prescriptions by PCP  - Continue lisinopril (PRINIVIL;ZESTRIL) 40 MG tablet; Take 1 tablet by mouth daily Additional prescriptions by PCP  - Continue metoprolol succinate (TOPROL XL) 50 MG extended release tablet; Take 1 tablet by mouth 2 times daily Additional prescriptions by PCP  - Continue spironolactone (ALDACTONE) 25 MG tablet; Take 1 tablet by mouth daily Additional prescriptions by PC  - Start taking amLODIPine (NORVASC) 2.5 MG tablet; Take 1 tablet by mouth daily    Pancreatitis, unspecified pancreatitis type        - Discussed at length the hospital admission and metabolic pancreatitis. - Discussed the need for aggressive lifestyle and diet modifications. Non-alcoholic fatty liver disease   - Discussed the need for aggressive lifestyle and diet modifications to lower BMI. Morbid obesity with BMI of 50.0-59.9, adult ([facility] [address]   - Discussed the need for aggressive lifestyle and diet modifications. - Patient declined referral for weight loss. Will try to lose weight himself before getting a referral.   - Patient started diet modifications with more fruits and vegetables. Continue current diet.    - Patient will return to the gym and counseled about exercising 4-5 times a week for 30 minutes continuously. - We can refer the patient to Dr. Mary Venegas if the patient is interested. Blindness of right eye with normal vision in contralateral eye- stable  - Patient states he has normal vision in the left eye.   - stable, no changes. Surgery when he was a child. Tobacco Abuse  - Smoking history (32 pack years)  - Discussed smoking cessation- patient interested and willing to try before seeking assistance with nicotine patch/gum. - Patient can call the office for prescription for nicotine patch/gum if interested. HCM  - Will discuss further about health care maintenance at next visit. I have reviewed all pertient PMHx, PSHx, FamHx, Social Hx, medications, and allergies and updated history as appropriate. RTC: 2 months    I have reviewed my findings and recommendations with Negin Bill and Dr. Maryann Ortiz.      Evan Wesley MD PGY-1   10/9/2020 3:48 PM

## 2023-11-22 NOTE — PROGRESS NOTES
Rectal Exam    Patient was placed in the left lateal decubitus position. No external hemorrhoids appreciated. Rectal tone was normal. Minimal evaluation of the the rectal vault was possible due to patient's body habitus. Stool was brown. FOBT Negative. Patient tolerated this well without immediate complication.     Electronically signed by Yane Varela DO on 10/2/2020 at 6:50 PM No

## 2025-06-19 ENCOUNTER — OFFICE VISIT (OUTPATIENT)
Dept: FAMILY MEDICINE CLINIC | Age: 55
End: 2025-06-19
Payer: COMMERCIAL

## 2025-06-19 VITALS
TEMPERATURE: 97.3 F | BODY MASS INDEX: 42.66 KG/M2 | OXYGEN SATURATION: 98 % | WEIGHT: 315 LBS | HEART RATE: 78 BPM | DIASTOLIC BLOOD PRESSURE: 96 MMHG | SYSTOLIC BLOOD PRESSURE: 158 MMHG | HEIGHT: 72 IN | RESPIRATION RATE: 20 BRPM

## 2025-06-19 DIAGNOSIS — Z87.891 PERSONAL HISTORY OF TOBACCO USE: ICD-10-CM

## 2025-06-19 DIAGNOSIS — R73.03 PREDIABETES: ICD-10-CM

## 2025-06-19 DIAGNOSIS — M25.562 CHRONIC PAIN OF BOTH KNEES: ICD-10-CM

## 2025-06-19 DIAGNOSIS — Z12.11 COLON CANCER SCREENING: ICD-10-CM

## 2025-06-19 DIAGNOSIS — E55.9 VITAMIN D DEFICIENCY: ICD-10-CM

## 2025-06-19 DIAGNOSIS — Z00.00 ENCOUNTER FOR MEDICAL EXAMINATION TO ESTABLISH CARE: Primary | ICD-10-CM

## 2025-06-19 DIAGNOSIS — Z72.0 VAPES NICOTINE CONTAINING SUBSTANCE: ICD-10-CM

## 2025-06-19 DIAGNOSIS — G89.29 CHRONIC PAIN OF BOTH KNEES: ICD-10-CM

## 2025-06-19 DIAGNOSIS — M25.561 CHRONIC PAIN OF BOTH KNEES: ICD-10-CM

## 2025-06-19 DIAGNOSIS — E66.01 MORBID OBESITY WITH BMI OF 50.0-59.9, ADULT (HCC): ICD-10-CM

## 2025-06-19 DIAGNOSIS — K85.90 PANCREATITIS, UNSPECIFIED PANCREATITIS TYPE: ICD-10-CM

## 2025-06-19 DIAGNOSIS — I10 UNCONTROLLED HYPERTENSION: ICD-10-CM

## 2025-06-19 LAB
ALBUMIN: 4.2 G/DL (ref 3.5–5.2)
ALP BLD-CCNC: 96 U/L (ref 40–129)
ALT SERPL-CCNC: 8 U/L (ref 0–50)
ANION GAP SERPL CALCULATED.3IONS-SCNC: 11 MMOL/L (ref 7–16)
AST SERPL-CCNC: 21 U/L (ref 0–50)
BASOPHILS ABSOLUTE: 0.04 K/UL (ref 0–0.2)
BASOPHILS RELATIVE PERCENT: 1 % (ref 0–2)
BILIRUB SERPL-MCNC: 0.7 MG/DL (ref 0–1.2)
BUN BLDV-MCNC: 10 MG/DL (ref 6–20)
CALCIUM SERPL-MCNC: 9.5 MG/DL (ref 8.6–10)
CHLORIDE BLD-SCNC: 105 MMOL/L (ref 98–107)
CHOLESTEROL, TOTAL: 183 MG/DL
CO2: 23 MMOL/L (ref 22–29)
CREAT SERPL-MCNC: 1 MG/DL (ref 0.7–1.2)
EOSINOPHILS ABSOLUTE: 0.06 K/UL (ref 0.05–0.5)
EOSINOPHILS RELATIVE PERCENT: 1 % (ref 0–6)
GFR, ESTIMATED: >90 ML/MIN/1.73M2
GLUCOSE BLD-MCNC: 95 MG/DL (ref 74–99)
HBA1C MFR BLD: 5.5 % (ref 4–5.6)
HCT VFR BLD CALC: 41.4 % (ref 37–54)
HDLC SERPL-MCNC: 36 MG/DL
HEMOGLOBIN: 13.7 G/DL (ref 12.5–16.5)
IMMATURE GRANULOCYTES %: 0 % (ref 0–5)
IMMATURE GRANULOCYTES ABSOLUTE: <0.03 K/UL (ref 0–0.58)
LDL CHOLESTEROL: 129 MG/DL
LIPASE: 29 U/L (ref 13–60)
LYMPHOCYTES ABSOLUTE: 2.7 K/UL (ref 1.5–4)
LYMPHOCYTES RELATIVE PERCENT: 31 % (ref 20–42)
MCH RBC QN AUTO: 30.1 PG (ref 26–35)
MCHC RBC AUTO-ENTMCNC: 33.1 G/DL (ref 32–34.5)
MCV RBC AUTO: 91 FL (ref 80–99.9)
MONOCYTES ABSOLUTE: 0.75 K/UL (ref 0.1–0.95)
MONOCYTES RELATIVE PERCENT: 9 % (ref 2–12)
NEUTROPHILS ABSOLUTE: 5.22 K/UL (ref 1.8–7.3)
NEUTROPHILS RELATIVE PERCENT: 59 % (ref 43–80)
PDW BLD-RTO: 13.5 % (ref 11.5–15)
PLATELET # BLD: 266 K/UL (ref 130–450)
PMV BLD AUTO: 11.8 FL (ref 7–12)
POTASSIUM SERPL-SCNC: 4.2 MMOL/L (ref 3.5–5.1)
RBC # BLD: 4.55 M/UL (ref 3.8–5.8)
SODIUM BLD-SCNC: 139 MMOL/L (ref 136–145)
TOTAL PROTEIN: 7.8 G/DL (ref 6.4–8.3)
TRIGL SERPL-MCNC: 88 MG/DL
TSH SERPL DL<=0.05 MIU/L-ACNC: 1.13 UIU/ML (ref 0.27–4.2)
VITAMIN D 25-HYDROXY: 18.9 NG/ML (ref 30–100)
VLDLC SERPL CALC-MCNC: 18 MG/DL
WBC # BLD: 8.8 K/UL (ref 4.5–11.5)

## 2025-06-19 PROCEDURE — 99204 OFFICE O/P NEW MOD 45 MIN: CPT | Performed by: STUDENT IN AN ORGANIZED HEALTH CARE EDUCATION/TRAINING PROGRAM

## 2025-06-19 PROCEDURE — G0296 VISIT TO DETERM LDCT ELIG: HCPCS | Performed by: STUDENT IN AN ORGANIZED HEALTH CARE EDUCATION/TRAINING PROGRAM

## 2025-06-19 PROCEDURE — 3080F DIAST BP >= 90 MM HG: CPT | Performed by: STUDENT IN AN ORGANIZED HEALTH CARE EDUCATION/TRAINING PROGRAM

## 2025-06-19 PROCEDURE — G8417 CALC BMI ABV UP PARAM F/U: HCPCS | Performed by: STUDENT IN AN ORGANIZED HEALTH CARE EDUCATION/TRAINING PROGRAM

## 2025-06-19 PROCEDURE — G8427 DOCREV CUR MEDS BY ELIG CLIN: HCPCS | Performed by: STUDENT IN AN ORGANIZED HEALTH CARE EDUCATION/TRAINING PROGRAM

## 2025-06-19 PROCEDURE — 3017F COLORECTAL CA SCREEN DOC REV: CPT | Performed by: STUDENT IN AN ORGANIZED HEALTH CARE EDUCATION/TRAINING PROGRAM

## 2025-06-19 PROCEDURE — 1036F TOBACCO NON-USER: CPT | Performed by: STUDENT IN AN ORGANIZED HEALTH CARE EDUCATION/TRAINING PROGRAM

## 2025-06-19 PROCEDURE — 3077F SYST BP >= 140 MM HG: CPT | Performed by: STUDENT IN AN ORGANIZED HEALTH CARE EDUCATION/TRAINING PROGRAM

## 2025-06-19 RX ORDER — ONDANSETRON 4 MG/1
4 TABLET, FILM COATED ORAL DAILY PRN
Qty: 30 TABLET | Refills: 0 | Status: SHIPPED | OUTPATIENT
Start: 2025-06-19

## 2025-06-19 RX ORDER — POLYETHYLENE GLYCOL 3350 17 G
2 POWDER IN PACKET (EA) ORAL PRN
Qty: 100 EACH | Refills: 3 | Status: SHIPPED | OUTPATIENT
Start: 2025-06-19

## 2025-06-19 RX ORDER — AMLODIPINE BESYLATE 5 MG/1
5 TABLET ORAL DAILY
Qty: 90 TABLET | Refills: 0 | Status: SHIPPED | OUTPATIENT
Start: 2025-06-19

## 2025-06-19 SDOH — ECONOMIC STABILITY: FOOD INSECURITY: WITHIN THE PAST 12 MONTHS, YOU WORRIED THAT YOUR FOOD WOULD RUN OUT BEFORE YOU GOT MONEY TO BUY MORE.: NEVER TRUE

## 2025-06-19 SDOH — ECONOMIC STABILITY: FOOD INSECURITY: WITHIN THE PAST 12 MONTHS, THE FOOD YOU BOUGHT JUST DIDN'T LAST AND YOU DIDN'T HAVE MONEY TO GET MORE.: NEVER TRUE

## 2025-06-19 ASSESSMENT — ENCOUNTER SYMPTOMS
EYE PAIN: 0
RHINORRHEA: 0
SHORTNESS OF BREATH: 0
CHEST TIGHTNESS: 0
NAUSEA: 1
COUGH: 0
CONSTIPATION: 0
VOMITING: 1
DIARRHEA: 0
ABDOMINAL PAIN: 1
BACK PAIN: 0

## 2025-06-19 ASSESSMENT — PATIENT HEALTH QUESTIONNAIRE - PHQ9
SUM OF ALL RESPONSES TO PHQ QUESTIONS 1-9: 2
2. FEELING DOWN, DEPRESSED OR HOPELESS: SEVERAL DAYS
SUM OF ALL RESPONSES TO PHQ QUESTIONS 1-9: 2
1. LITTLE INTEREST OR PLEASURE IN DOING THINGS: SEVERAL DAYS

## 2025-06-19 NOTE — PROGRESS NOTES
6/19/2025    [facility]  Chief Complaint   Patient presents with    New Patient    Abdominal Pain     History of pancreatitis - having burning and pain x 1 week     Hypertension       Patricio Pollack is a 54 y.o. male who  has a past medical history of Class 3 severe obesity due to excess calories with serious comorbidity and body mass index (BMI) of 45.0 to 49.9 in adult (HCC) and Hypertension.     History of Present Illness  The patient presents for evaluation of pancreatitis, hypertension, nicotine dependence, and bilateral knee pain.    Patient is here today to establish care with myself.  Patient has no known allergies.  Patient has not seen a primary care doctor in several years.    He has a history of pancreatitis and is currently experiencing similar symptoms, including nausea and vomiting, which have persisted for more than 2 days. His last meal was consumed over 2 days ago, and he recalls eating grapes from the refrigerator prior to the onset of his symptoms. He has not experienced any diarrhea. He was previously hospitalized for 3 days due to pancreatitis, during which he did not require a nasogastric tube. Prior to this hospitalization, he had been unwell for 14 days. He also reports frequent visits to the emergency room, where he was informed that his gallbladder was causing heartburn.    He has a history of hypertension but has not been on antihypertensive medication for an extended period. He reports no dizziness, lightheadedness, headaches, or blurry vision. However, he has been experiencing increased fatigue and sleepiness. He was previously on amlodipine and spironolactone, which caused frequent urination, but he did not experience any side effects such as leg swelling, cough, dizziness, or headaches.    He quit smoking cigarettes in 2020 and has since taken up vaping, although he reports minimal use. He is interested in trying lozenges to aid in smoking cessation.    He has been experiencing

## 2025-06-19 NOTE — PATIENT INSTRUCTIONS
follow-up, he or she will help you understand what to do next.  After a lung cancer screening, you can go back to your usual activities right away.  A lung cancer screening test can't tell if you have lung cancer. If your results are positive, your doctor can't tell whether an abnormal finding is a harmless nodule, cancer, or something else without doing more tests.  What can you do to help prevent lung cancer?  Some lung cancers can't be prevented. But if you smoke, quitting smoking is the best step you can take to prevent lung cancer. If you want to quit, your doctor can recommend medicines or other ways to help.  Follow-up care is a key part of your treatment and safety. Be sure to make and go to all appointments, and call your doctor if you are having problems. It's also a good idea to know your test results and keep a list of the medicines you take.  Where can you learn more?  Go to https://www.[employer].net/patientEd and enter Q940 to learn more about \"Learning About Lung Cancer Screening.\"  Current as of: October 25, 2024  Content Version: 14.5  © 9443-7044 [employer].   Care instructions adapted under license by [employer]. If you have questions about a medical condition or this instruction, always ask your healthcare professional. [employer] disclaims any warranty or liability for your use of this information.

## 2025-06-20 ENCOUNTER — HOSPITAL ENCOUNTER (EMERGENCY)
Age: 55
Discharge: HOME | End: 2025-06-20
Payer: COMMERCIAL

## 2025-06-20 ENCOUNTER — APPOINTMENT (OUTPATIENT)
Dept: CT IMAGING | Age: 55
End: 2025-06-20
Payer: COMMERCIAL

## 2025-06-20 VITALS
OXYGEN SATURATION: 98 % | HEIGHT: 72 IN | BODY MASS INDEX: 42.66 KG/M2 | DIASTOLIC BLOOD PRESSURE: 85 MMHG | WEIGHT: 315 LBS | SYSTOLIC BLOOD PRESSURE: 148 MMHG | TEMPERATURE: 98.4 F | RESPIRATION RATE: 16 BRPM | HEART RATE: 68 BPM

## 2025-06-20 DIAGNOSIS — K57.90 DIVERTICULOSIS: ICD-10-CM

## 2025-06-20 DIAGNOSIS — R10.12 LEFT UPPER QUADRANT ABDOMINAL PAIN: Primary | ICD-10-CM

## 2025-06-20 DIAGNOSIS — R91.1 PULMONARY NODULE: ICD-10-CM

## 2025-06-20 DIAGNOSIS — R11.2 NAUSEA AND VOMITING, UNSPECIFIED VOMITING TYPE: ICD-10-CM

## 2025-06-20 DIAGNOSIS — I10 ELEVATED BLOOD PRESSURE READING WITH DIAGNOSIS OF HYPERTENSION: ICD-10-CM

## 2025-06-20 LAB
ALBUMIN SERPL-MCNC: 4.2 G/DL (ref 3.5–5.2)
ALP SERPL-CCNC: 101 U/L (ref 40–129)
ALT SERPL-CCNC: 7 U/L (ref 0–40)
ANION GAP SERPL CALCULATED.3IONS-SCNC: 12 MMOL/L (ref 7–16)
AST SERPL-CCNC: 15 U/L (ref 0–39)
BASOPHILS # BLD: 0.04 K/UL (ref 0–0.2)
BASOPHILS NFR BLD: 1 % (ref 0–2)
BILIRUB SERPL-MCNC: 1 MG/DL (ref 0–1.2)
BILIRUB UR QL STRIP: NEGATIVE
BUN SERPL-MCNC: 9 MG/DL (ref 6–20)
CALCIUM SERPL-MCNC: 9.3 MG/DL (ref 8.6–10.2)
CHLORIDE SERPL-SCNC: 102 MMOL/L (ref 98–107)
CHOLEST SERPL-MCNC: 171 MG/DL
CLARITY UR: CLEAR
CO2 SERPL-SCNC: 23 MMOL/L (ref 22–29)
COLOR UR: YELLOW
CREAT SERPL-MCNC: 0.9 MG/DL (ref 0.7–1.2)
EKG ATRIAL RATE: 54 BPM
EKG P AXIS: 17 DEGREES
EKG P-R INTERVAL: 186 MS
EKG Q-T INTERVAL: 418 MS
EKG QRS DURATION: 116 MS
EKG QTC CALCULATION (BAZETT): 396 MS
EKG R AXIS: 2 DEGREES
EKG T AXIS: 9 DEGREES
EKG VENTRICULAR RATE: 54 BPM
EOSINOPHIL # BLD: 0.08 K/UL (ref 0.05–0.5)
EOSINOPHILS RELATIVE PERCENT: 1 % (ref 0–6)
EPI CELLS #/AREA URNS HPF: ABNORMAL /HPF
ERYTHROCYTE [DISTWIDTH] IN BLOOD BY AUTOMATED COUNT: 13.6 % (ref 11.5–15)
GFR, ESTIMATED: >90 ML/MIN/1.73M2
GLUCOSE SERPL-MCNC: 93 MG/DL (ref 74–99)
GLUCOSE UR STRIP-MCNC: NEGATIVE MG/DL
HCT VFR BLD AUTO: 41.5 % (ref 37–54)
HDLC SERPL-MCNC: 38 MG/DL
HGB BLD-MCNC: 13.9 G/DL (ref 12.5–16.5)
HGB UR QL STRIP.AUTO: ABNORMAL
IMM GRANULOCYTES # BLD AUTO: <0.03 K/UL (ref 0–0.58)
IMM GRANULOCYTES NFR BLD: 0 % (ref 0–5)
KETONES UR STRIP-MCNC: NEGATIVE MG/DL
LACTATE BLDV-SCNC: 0.8 MMOL/L (ref 0.5–2.2)
LDLC SERPL CALC-MCNC: 117 MG/DL
LEUKOCYTE ESTERASE UR QL STRIP: ABNORMAL
LIPASE SERPL-CCNC: 38 U/L (ref 13–60)
LYMPHOCYTES NFR BLD: 2.26 K/UL (ref 1.5–4)
LYMPHOCYTES RELATIVE PERCENT: 27 % (ref 20–42)
MCH RBC QN AUTO: 29.8 PG (ref 26–35)
MCHC RBC AUTO-ENTMCNC: 33.5 G/DL (ref 32–34.5)
MCV RBC AUTO: 89.1 FL (ref 80–99.9)
MONOCYTES NFR BLD: 0.67 K/UL (ref 0.1–0.95)
MONOCYTES NFR BLD: 8 % (ref 2–12)
MUCOUS THREADS URNS QL MICRO: PRESENT
NEUTROPHILS NFR BLD: 63 % (ref 43–80)
NEUTS SEG NFR BLD: 5.24 K/UL (ref 1.8–7.3)
NITRITE UR QL STRIP: NEGATIVE
PH UR STRIP: 6 [PH] (ref 5–8)
PLATELET # BLD AUTO: 235 K/UL (ref 130–450)
PMV BLD AUTO: 11.1 FL (ref 7–12)
POTASSIUM SERPL-SCNC: 3.8 MMOL/L (ref 3.5–5)
PROT SERPL-MCNC: 7.7 G/DL (ref 6.4–8.3)
PROT UR STRIP-MCNC: NEGATIVE MG/DL
RBC # BLD AUTO: 4.66 M/UL (ref 3.8–5.8)
RBC #/AREA URNS HPF: ABNORMAL /HPF
SODIUM SERPL-SCNC: 137 MMOL/L (ref 132–146)
SP GR UR STRIP: 1.01 (ref 1–1.03)
TRIGL SERPL-MCNC: 78 MG/DL
TROPONIN I SERPL HS-MCNC: 8 NG/L (ref 0–22)
UROBILINOGEN UR STRIP-ACNC: 0.2 EU/DL (ref 0–1)
VLDLC SERPL CALC-MCNC: 16 MG/DL
WBC #/AREA URNS HPF: ABNORMAL /HPF
WBC OTHER # BLD: 8.3 K/UL (ref 4.5–11.5)

## 2025-06-20 PROCEDURE — 74177 CT ABD & PELVIS W/CONTRAST: CPT

## 2025-06-20 PROCEDURE — 83690 ASSAY OF LIPASE: CPT

## 2025-06-20 PROCEDURE — 2500000003 HC RX 250 WO HCPCS

## 2025-06-20 PROCEDURE — 96374 THER/PROPH/DIAG INJ IV PUSH: CPT

## 2025-06-20 PROCEDURE — 96375 TX/PRO/DX INJ NEW DRUG ADDON: CPT

## 2025-06-20 PROCEDURE — 80061 LIPID PANEL: CPT

## 2025-06-20 PROCEDURE — 99285 EMERGENCY DEPT VISIT HI MDM: CPT

## 2025-06-20 PROCEDURE — 2580000003 HC RX 258

## 2025-06-20 PROCEDURE — 6370000000 HC RX 637 (ALT 250 FOR IP)

## 2025-06-20 PROCEDURE — 84484 ASSAY OF TROPONIN QUANT: CPT

## 2025-06-20 PROCEDURE — 6360000002 HC RX W HCPCS

## 2025-06-20 PROCEDURE — 6360000004 HC RX CONTRAST MEDICATION: Performed by: RADIOLOGY

## 2025-06-20 PROCEDURE — 80053 COMPREHEN METABOLIC PANEL: CPT

## 2025-06-20 PROCEDURE — 85025 COMPLETE CBC W/AUTO DIFF WBC: CPT

## 2025-06-20 PROCEDURE — 81001 URINALYSIS AUTO W/SCOPE: CPT

## 2025-06-20 PROCEDURE — 93005 ELECTROCARDIOGRAM TRACING: CPT

## 2025-06-20 PROCEDURE — 93010 ELECTROCARDIOGRAM REPORT: CPT | Performed by: INTERNAL MEDICINE

## 2025-06-20 PROCEDURE — 83605 ASSAY OF LACTIC ACID: CPT

## 2025-06-20 PROCEDURE — 96372 THER/PROPH/DIAG INJ SC/IM: CPT

## 2025-06-20 RX ORDER — ONDANSETRON 2 MG/ML
4 INJECTION INTRAMUSCULAR; INTRAVENOUS ONCE
Status: COMPLETED | OUTPATIENT
Start: 2025-06-20 | End: 2025-06-20

## 2025-06-20 RX ORDER — IOPAMIDOL 755 MG/ML
75 INJECTION, SOLUTION INTRAVASCULAR
Status: COMPLETED | OUTPATIENT
Start: 2025-06-20 | End: 2025-06-20

## 2025-06-20 RX ORDER — 0.9 % SODIUM CHLORIDE 0.9 %
1000 INTRAVENOUS SOLUTION INTRAVENOUS ONCE
Status: COMPLETED | OUTPATIENT
Start: 2025-06-20 | End: 2025-06-20

## 2025-06-20 RX ORDER — OMEPRAZOLE 40 MG/1
40 CAPSULE, DELAYED RELEASE ORAL
Qty: 90 CAPSULE | Refills: 0 | Status: SHIPPED | OUTPATIENT
Start: 2025-06-20

## 2025-06-20 RX ORDER — DIPHENHYDRAMINE HYDROCHLORIDE 50 MG/ML
25 INJECTION, SOLUTION INTRAMUSCULAR; INTRAVENOUS ONCE
Status: COMPLETED | OUTPATIENT
Start: 2025-06-20 | End: 2025-06-20

## 2025-06-20 RX ORDER — METOCLOPRAMIDE HYDROCHLORIDE 5 MG/ML
10 INJECTION INTRAMUSCULAR; INTRAVENOUS ONCE
Status: COMPLETED | OUTPATIENT
Start: 2025-06-20 | End: 2025-06-20

## 2025-06-20 RX ADMIN — HYDROMORPHONE HYDROCHLORIDE 1 MG: 1 INJECTION, SOLUTION INTRAMUSCULAR; INTRAVENOUS; SUBCUTANEOUS at 09:35

## 2025-06-20 RX ADMIN — DIPHENHYDRAMINE HYDROCHLORIDE 25 MG: 50 INJECTION INTRAMUSCULAR; INTRAVENOUS at 13:03

## 2025-06-20 RX ADMIN — METOCLOPRAMIDE 10 MG: 5 INJECTION, SOLUTION INTRAMUSCULAR; INTRAVENOUS at 13:03

## 2025-06-20 RX ADMIN — LIDOCAINE HYDROCHLORIDE: 20 SOLUTION ORAL at 13:02

## 2025-06-20 RX ADMIN — FAMOTIDINE 20 MG: 10 INJECTION, SOLUTION INTRAVENOUS at 13:04

## 2025-06-20 RX ADMIN — IOPAMIDOL 75 ML: 755 INJECTION, SOLUTION INTRAVENOUS at 11:18

## 2025-06-20 RX ADMIN — ONDANSETRON 4 MG: 2 INJECTION, SOLUTION INTRAMUSCULAR; INTRAVENOUS at 09:35

## 2025-06-20 RX ADMIN — SODIUM CHLORIDE 1000 ML: 0.9 INJECTION, SOLUTION INTRAVENOUS at 09:33

## 2025-06-20 ASSESSMENT — LIFESTYLE VARIABLES
HOW OFTEN DO YOU HAVE A DRINK CONTAINING ALCOHOL: NEVER
HOW MANY STANDARD DRINKS CONTAINING ALCOHOL DO YOU HAVE ON A TYPICAL DAY: PATIENT DOES NOT DRINK

## 2025-06-20 ASSESSMENT — PAIN DESCRIPTION - LOCATION: LOCATION: ABDOMEN;BACK

## 2025-06-20 ASSESSMENT — PAIN DESCRIPTION - DESCRIPTORS: DESCRIPTORS: BURNING;PRESSURE

## 2025-06-20 ASSESSMENT — PAIN - FUNCTIONAL ASSESSMENT: PAIN_FUNCTIONAL_ASSESSMENT: 0-10

## 2025-06-20 ASSESSMENT — PAIN DESCRIPTION - ORIENTATION: ORIENTATION: LEFT;UPPER;LOWER

## 2025-06-20 ASSESSMENT — PAIN SCALES - GENERAL: PAINLEVEL_OUTOF10: 7

## 2025-06-20 NOTE — ED PROVIDER NOTES
Independent PO Visit          [facility] EMERGENCY DEPARTMENT  EMERGENCY DEPARTMENT ENCOUNTER        Pt Name: Patricio Pollack  MRN: [id number]  Birthdate 1970  Date of evaluation: 6/20/2025  Provider: NURIA Coleman CNP  PCP: Nathalie Waddell DO  Note Started: 8:37 AM EDT 6/20/25    CHIEF COMPLAINT       Chief Complaint   Patient presents with    Abdominal Pain     LUQ pain x 1 week. Hx of pancreatitis. Denies V/D    Nausea       HISTORY OF PRESENT ILLNESS: 1 or more Elements   History from : Patient and chart review  Limitations to history : None    Patricio Pollack is a 54 y.o. male with a history of hypertension, nonalcoholic fatty liver disease, and metabolic pancreatitis who presents to the emergency department  by private vehicle, for gradual onset constant  burning and pressure pain in the LUQ without radiation which began a little over 1 week(s) prior to arrival.  Pain is a 7/10. There has been similar episodes in the past with pancreatitis and this feels the same, but is worse.  Since onset the symptoms have been gradually worsening.  The pain is associated with chills last night, intermittent nausea,several episodes of nonbilious nonbloody vomiting, constipation, and bilateral lower back pain that started today.  The pain is aggravated by eating and pressure  on the area and relieved by nothing. He has tried not eating for the last 3 days to see if it would subside. There has been NO chest pain, shortness of breath, fever, sweating, diarrhea, dark/black stools, blood in stool, blood in emesis, urinary frequency, dysuria, hematuria, urinary urgency, penile discharge, or scrotal pain. He has not taken anything for his pain as he did not know what to take.  He denies any recent ETOH use. He admits to marijuana us and vaping.  He did see his PMD yesterday and was prescribed Zofran and was started on amlodipine.  He did not take any of his medications yet today.       CAD  COUNSELING:  NURIA Coleman CNP reviewed today's visit with the patient. This included the differential, results, and plan of care, in addition to providing specific details for the plan of care and counseling regarding the diagnosis and prognosis and they are agreeable with the plan. All results reviewed with patient and all questions answered. The patient expressed understanding.    Patient is currently established with a PMD and will follow-up in 1 day as instructed. A referral was provided to Dr. Pedraza, on call general surgeon for follow up. I emphasized the importance of follow-up with the physician I referred them to in the timeframe recommended. I discussed with the patient emergent symptoms and the need to immediately return to the ER for any new or worsening symptoms as discussed above. Written information was included in their discharge instructions. Additional verbal discharge instructions were also given and discussed with the patient to supplement those generated by the EMR. We also discussed medications that were prescribed  including common side effects and interactions. All questions were addressed.  They understand return precautions and discharge instructions. The patient expressed understanding. Vitals were stable and they were in no distress at discharge.     FINAL IMPRESSION      1. Left upper quadrant abdominal pain    2. Nausea and vomiting, unspecified vomiting type    3. Elevated blood pressure reading with diagnosis of hypertension    4. Pulmonary nodule    5. Diverticulosis          DISPOSITION/PLAN     DISPOSITION Decision To Discharge 06/20/2025 12:46:33 PM  Discharge to home.  Patient condition is good.    PATIENT REFERRED TO:  Nathalie Waddell, DO  [address] [place] [id number]  [phone]    Schedule an appointment as soon as possible for a visit       Claudy Pedraza MD  [address]  [facility] [address] [phone]            DISCHARGE

## 2025-06-22 ENCOUNTER — APPOINTMENT (OUTPATIENT)
Dept: ULTRASOUND IMAGING | Age: 55
End: 2025-06-22
Payer: COMMERCIAL

## 2025-06-22 VITALS
TEMPERATURE: 98.8 F | RESPIRATION RATE: 18 BRPM | SYSTOLIC BLOOD PRESSURE: 136 MMHG | WEIGHT: 315 LBS | HEIGHT: 72 IN | HEART RATE: 75 BPM | OXYGEN SATURATION: 96 % | BODY MASS INDEX: 42.66 KG/M2 | DIASTOLIC BLOOD PRESSURE: 90 MMHG

## 2025-06-22 LAB
ALBUMIN SERPL-MCNC: 4.2 G/DL (ref 3.5–5.2)
ALP SERPL-CCNC: 103 U/L (ref 40–129)
ALT SERPL-CCNC: 8 U/L (ref 0–50)
ANION GAP SERPL CALCULATED.3IONS-SCNC: 14 MMOL/L (ref 7–16)
AST SERPL-CCNC: 25 U/L (ref 0–50)
BASOPHILS # BLD: 0.05 K/UL (ref 0–0.2)
BASOPHILS NFR BLD: 1 % (ref 0–2)
BILIRUB SERPL-MCNC: 1 MG/DL (ref 0–1.2)
BUN SERPL-MCNC: 10 MG/DL (ref 6–20)
CALCIUM SERPL-MCNC: 8.9 MG/DL (ref 8.6–10)
CHLORIDE SERPL-SCNC: 102 MMOL/L (ref 98–107)
CO2 SERPL-SCNC: 22 MMOL/L (ref 22–29)
CREAT SERPL-MCNC: 1 MG/DL (ref 0.7–1.2)
EOSINOPHIL # BLD: 0.05 K/UL (ref 0.05–0.5)
EOSINOPHILS RELATIVE PERCENT: 1 % (ref 0–6)
ERYTHROCYTE [DISTWIDTH] IN BLOOD BY AUTOMATED COUNT: 13.1 % (ref 11.5–15)
GFR, ESTIMATED: >90 ML/MIN/1.73M2
GLUCOSE SERPL-MCNC: 101 MG/DL (ref 74–99)
HCT VFR BLD AUTO: 39.5 % (ref 37–54)
HGB BLD-MCNC: 13.8 G/DL (ref 12.5–16.5)
IMM GRANULOCYTES # BLD AUTO: <0.03 K/UL (ref 0–0.58)
IMM GRANULOCYTES NFR BLD: 0 % (ref 0–5)
LACTATE BLDV-SCNC: 2.2 MMOL/L (ref 0.5–2.2)
LIPASE SERPL-CCNC: 46 U/L (ref 13–60)
LYMPHOCYTES NFR BLD: 2.9 K/UL (ref 1.5–4)
LYMPHOCYTES RELATIVE PERCENT: 32 % (ref 20–42)
MCH RBC QN AUTO: 30.9 PG (ref 26–35)
MCHC RBC AUTO-ENTMCNC: 34.9 G/DL (ref 32–34.5)
MCV RBC AUTO: 88.4 FL (ref 80–99.9)
MONOCYTES NFR BLD: 0.68 K/UL (ref 0.1–0.95)
MONOCYTES NFR BLD: 7 % (ref 2–12)
NEUTROPHILS NFR BLD: 60 % (ref 43–80)
NEUTS SEG NFR BLD: 5.51 K/UL (ref 1.8–7.3)
PLATELET # BLD AUTO: 248 K/UL (ref 130–450)
PMV BLD AUTO: 10.7 FL (ref 7–12)
POTASSIUM SERPL-SCNC: 3.9 MMOL/L (ref 3.5–5.1)
PROT SERPL-MCNC: 7.7 G/DL (ref 6.4–8.3)
RBC # BLD AUTO: 4.47 M/UL (ref 3.8–5.8)
SODIUM SERPL-SCNC: 139 MMOL/L (ref 136–145)
WBC OTHER # BLD: 9.2 K/UL (ref 4.5–11.5)

## 2025-06-22 PROCEDURE — 6370000000 HC RX 637 (ALT 250 FOR IP): Performed by: NURSE PRACTITIONER

## 2025-06-22 PROCEDURE — 76705 ECHO EXAM OF ABDOMEN: CPT

## 2025-06-22 PROCEDURE — 80053 COMPREHEN METABOLIC PANEL: CPT

## 2025-06-22 PROCEDURE — 99284 EMERGENCY DEPT VISIT MOD MDM: CPT

## 2025-06-22 PROCEDURE — 83605 ASSAY OF LACTIC ACID: CPT

## 2025-06-22 PROCEDURE — 83690 ASSAY OF LIPASE: CPT

## 2025-06-22 PROCEDURE — 2580000003 HC RX 258: Performed by: EMERGENCY MEDICINE

## 2025-06-22 PROCEDURE — 85025 COMPLETE CBC W/AUTO DIFF WBC: CPT

## 2025-06-22 RX ORDER — SUCRALFATE 1 G/1
1 TABLET ORAL ONCE
Status: COMPLETED | OUTPATIENT
Start: 2025-06-22 | End: 2025-06-22

## 2025-06-22 RX ORDER — 0.9 % SODIUM CHLORIDE 0.9 %
1000 INTRAVENOUS SOLUTION INTRAVENOUS ONCE
Status: COMPLETED | OUTPATIENT
Start: 2025-06-22 | End: 2025-06-23

## 2025-06-22 RX ADMIN — SUCRALFATE 1 G: 1 TABLET ORAL at 22:47

## 2025-06-22 RX ADMIN — LIDOCAINE HYDROCHLORIDE: 20 SOLUTION ORAL at 22:48

## 2025-06-22 RX ADMIN — SODIUM CHLORIDE 1000 ML: 0.9 INJECTION, SOLUTION INTRAVENOUS at 22:12

## 2025-06-22 ASSESSMENT — LIFESTYLE VARIABLES: HOW OFTEN DO YOU HAVE A DRINK CONTAINING ALCOHOL: NEVER

## 2025-06-22 ASSESSMENT — PAIN DESCRIPTION - ORIENTATION: ORIENTATION: LOWER

## 2025-06-22 ASSESSMENT — PAIN DESCRIPTION - LOCATION: LOCATION: ABDOMEN

## 2025-06-22 ASSESSMENT — PAIN DESCRIPTION - DESCRIPTORS: DESCRIPTORS: ACHING;CRUSHING;DISCOMFORT

## 2025-06-22 ASSESSMENT — PAIN SCALES - GENERAL: PAINLEVEL_OUTOF10: 10

## 2025-06-22 ASSESSMENT — PAIN - FUNCTIONAL ASSESSMENT: PAIN_FUNCTIONAL_ASSESSMENT: 0-10

## 2025-06-23 ENCOUNTER — TELEPHONE (OUTPATIENT)
Dept: FAMILY MEDICINE CLINIC | Age: 55
End: 2025-06-23

## 2025-06-23 ENCOUNTER — HOSPITAL ENCOUNTER (EMERGENCY)
Age: 55
Discharge: HOME | End: 2025-06-23
Attending: EMERGENCY MEDICINE
Payer: COMMERCIAL

## 2025-06-23 DIAGNOSIS — R11.0 NAUSEA: ICD-10-CM

## 2025-06-23 DIAGNOSIS — R10.13 ABDOMINAL PAIN, EPIGASTRIC: Primary | ICD-10-CM

## 2025-06-23 PROCEDURE — 96374 THER/PROPH/DIAG INJ IV PUSH: CPT

## 2025-06-23 PROCEDURE — 6360000002 HC RX W HCPCS: Performed by: NURSE PRACTITIONER

## 2025-06-23 PROCEDURE — 96372 THER/PROPH/DIAG INJ SC/IM: CPT

## 2025-06-23 RX ORDER — DICYCLOMINE HCL 20 MG
20 TABLET ORAL 4 TIMES DAILY
Qty: 20 TABLET | Refills: 0 | Status: SHIPPED | OUTPATIENT
Start: 2025-06-23 | End: 2025-06-28

## 2025-06-23 RX ORDER — SUCRALFATE 1 G/1
1 TABLET ORAL 4 TIMES DAILY
Qty: 120 TABLET | Refills: 3 | Status: SHIPPED | OUTPATIENT
Start: 2025-06-23

## 2025-06-23 RX ORDER — DICYCLOMINE HYDROCHLORIDE 10 MG/ML
20 INJECTION INTRAMUSCULAR ONCE
Status: COMPLETED | OUTPATIENT
Start: 2025-06-23 | End: 2025-06-23

## 2025-06-23 RX ORDER — PANTOPRAZOLE SODIUM 40 MG/10ML
40 INJECTION, POWDER, LYOPHILIZED, FOR SOLUTION INTRAVENOUS ONCE
Status: COMPLETED | OUTPATIENT
Start: 2025-06-23 | End: 2025-06-23

## 2025-06-23 RX ADMIN — DICYCLOMINE HYDROCHLORIDE 20 MG: 10 INJECTION, SOLUTION INTRAMUSCULAR at 01:12

## 2025-06-23 RX ADMIN — PANTOPRAZOLE SODIUM 40 MG: 40 INJECTION, POWDER, FOR SOLUTION INTRAVENOUS at 01:08

## 2025-06-23 ASSESSMENT — PAIN DESCRIPTION - DESCRIPTORS: DESCRIPTORS: ACHING;CRAMPING;SHARP

## 2025-06-23 ASSESSMENT — PAIN DESCRIPTION - LOCATION: LOCATION: ABDOMEN

## 2025-06-23 ASSESSMENT — PAIN SCALES - GENERAL: PAINLEVEL_OUTOF10: 7

## 2025-06-23 NOTE — ED PROVIDER NOTES
Shared PO-ED Attending Visit.  CC: No       [facility] EMERGENCY DEPARTMENT  ED  Encounter Note  Admit Date/RoomTime: 2025 12:42 AM  ED Room: [place] [address]/5  NAME: Ptaricio Pollack  : 1970  MRN: [id number]  PCP: Nathalie Waddell DO    CHIEF COMPLAINT     Abdominal Pain (Severe abdominal pain has been going x2 weeks. Patient reports nausea and vomiting. Patient was seen at [place] 2 days ago and d/c home )    HISTORY OF PRESENT ILLNESS        Patricio Pollack is a 54 y.o. male who presents to the ED with report of epigastric abdominal pain going on for 2 weeks.  Has nausea and multiple episodes of vomiting.  Does not have any fever no chills.  He was seen and evaluated at [place] 2 days ago and discharged home after CT scan.  He has not had any follow-up.  Reports tolerating p.o. has been difficult.  No chest pain no shortness of breath.  Pain is primarily in the epigastrium and occasionally into the right side.  No alleviating or exacerbating factors.  REVIEW OF SYSTEMS     Pertinent positives and negatives are stated within HPI, all other systems reviewed and are negative.    Past Medical History:  has a past medical history of Class 3 severe obesity due to excess calories with serious comorbidity and body mass index (BMI) of 45.0 to 49.9 in adult (HCC) and Hypertension.  Surgical History:  has a past surgical history that includes Finger amputation (Left) and eye surgery ().  Social History:  reports that he quit smoking about 5 years ago. His smoking use included cigarettes. He started smoking about 37 years ago. He has a 32 pack-year smoking history. He has never used smokeless tobacco. He reports current drug use. Drug: Marijuana (Weed). He reports that he does not drink alcohol.  Family History: family history includes Diabetes in his mother; High Blood Pressure in his mother; Kidney Disease in his father.   Allergies: Patient has no known allergies.  CURRENT

## 2025-06-23 NOTE — PROGRESS NOTES
CLINICAL PHARMACY NOTE: MEDS TO BEDS    Total # of Prescriptions Filled: 2   The following medications were delivered to the patient:  Sucralfate 1 gm  Dicyclomine 20 mg    Additional Documentation:   Picked up

## 2025-06-23 NOTE — TELEPHONE ENCOUNTER
Pts wife calling to see if referral for a GI doctor can be placed he was seen in the ED and told he needs a scope so he will need a referral from PCP. He is in a lot of pain and they didn't admit him I did let the pts wife know that medication was sent to the pharmacy so that may help with his pain I also let her know that it does take some time between placing the referral and getting the scope.

## 2025-06-24 ENCOUNTER — RESULTS FOLLOW-UP (OUTPATIENT)
Dept: FAMILY MEDICINE CLINIC | Age: 55
End: 2025-06-24

## 2025-06-24 DIAGNOSIS — R10.13 EPIGASTRIC ABDOMINAL PAIN: Primary | ICD-10-CM

## 2025-06-24 DIAGNOSIS — E55.9 VITAMIN D DEFICIENCY: Primary | ICD-10-CM

## 2025-06-24 RX ORDER — ERGOCALCIFEROL 1.25 MG/1
50000 CAPSULE, LIQUID FILLED ORAL WEEKLY
Qty: 12 CAPSULE | Refills: 1 | Status: SHIPPED | OUTPATIENT
Start: 2025-06-24

## 2025-07-02 ENCOUNTER — LAB (OUTPATIENT)
Dept: FAMILY MEDICINE CLINIC | Age: 55
End: 2025-07-02
Payer: COMMERCIAL

## 2025-07-02 VITALS — SYSTOLIC BLOOD PRESSURE: 128 MMHG | HEART RATE: 86 BPM | DIASTOLIC BLOOD PRESSURE: 80 MMHG

## 2025-07-02 DIAGNOSIS — I10 UNCONTROLLED HYPERTENSION: Primary | ICD-10-CM

## 2025-07-02 PROCEDURE — 99211 OFF/OP EST MAY X REQ PHY/QHP: CPT | Performed by: STUDENT IN AN ORGANIZED HEALTH CARE EDUCATION/TRAINING PROGRAM
